# Patient Record
Sex: FEMALE | Race: WHITE | Employment: OTHER | URBAN - METROPOLITAN AREA
[De-identification: names, ages, dates, MRNs, and addresses within clinical notes are randomized per-mention and may not be internally consistent; named-entity substitution may affect disease eponyms.]

---

## 2017-09-23 ENCOUNTER — HOSPITAL ENCOUNTER (OUTPATIENT)
Dept: MAMMOGRAPHY | Age: 67
Discharge: HOME | End: 2017-09-23
Attending: FAMILY MEDICINE
Payer: MEDICARE

## 2017-09-23 DIAGNOSIS — Z12.31 VISIT FOR SCREENING MAMMOGRAM: ICD-10-CM

## 2017-09-23 PROCEDURE — 77067 SCR MAMMO BI INCL CAD: CPT

## 2018-10-21 ENCOUNTER — APPOINTMENT (OUTPATIENT)
Dept: CT IMAGING | Age: 68
DRG: 392 | End: 2018-10-21
Attending: EMERGENCY MEDICINE
Payer: MEDICARE

## 2018-10-21 ENCOUNTER — HOSPITAL ENCOUNTER (INPATIENT)
Age: 68
LOS: 2 days | Discharge: HOME | DRG: 392 | End: 2018-10-23
Attending: EMERGENCY MEDICINE | Admitting: HOSPITALIST
Payer: MEDICARE

## 2018-10-21 DIAGNOSIS — K57.92 ACUTE DIVERTICULITIS: Primary | ICD-10-CM

## 2018-10-21 PROBLEM — K57.20 DIVERTICULITIS OF COLON WITH PERFORATION: Status: ACTIVE | Noted: 2018-10-21

## 2018-10-21 LAB
ALBUMIN SERPL-MCNC: 3.3 G/DL (ref 3.2–4.6)
ALBUMIN/GLOB SERPL: 0.9 {RATIO}
ALP SERPL-CCNC: 79 U/L (ref 50–130)
ALT SERPL-CCNC: 27 U/L (ref 12–65)
ANION GAP SERPL CALC-SCNC: 7 MMOL/L
AST SERPL-CCNC: 18 U/L (ref 15–37)
BASOPHILS # BLD: 0.1 K/UL (ref 0–0.2)
BASOPHILS NFR BLD: 0 % (ref 0–2)
BILIRUB SERPL-MCNC: 0.6 MG/DL (ref 0.2–1.1)
BUN SERPL-MCNC: 8 MG/DL (ref 8–23)
CALCIUM SERPL-MCNC: 8.5 MG/DL (ref 8.3–10.4)
CHLORIDE SERPL-SCNC: 106 MMOL/L (ref 98–107)
CO2 SERPL-SCNC: 25 MMOL/L (ref 21–32)
CREAT SERPL-MCNC: 0.75 MG/DL (ref 0.6–1)
DIFFERENTIAL METHOD BLD: ABNORMAL
EOSINOPHIL # BLD: 0 K/UL (ref 0–0.8)
EOSINOPHIL NFR BLD: 0 % (ref 0.5–7.8)
ERYTHROCYTE [DISTWIDTH] IN BLOOD BY AUTOMATED COUNT: 12.5 %
GLOBULIN SER CALC-MCNC: 3.6 G/DL (ref 2.3–3.5)
GLUCOSE SERPL-MCNC: 99 MG/DL (ref 65–100)
HCT VFR BLD AUTO: 40.8 % (ref 35.8–46.3)
HGB BLD-MCNC: 12.8 G/DL (ref 11.7–15.4)
IMM GRANULOCYTES # BLD: 0 K/UL (ref 0–0.5)
IMM GRANULOCYTES NFR BLD AUTO: 0 % (ref 0–5)
LIPASE SERPL-CCNC: 132 U/L (ref 73–393)
LYMPHOCYTES # BLD: 1.4 K/UL (ref 0.5–4.6)
LYMPHOCYTES NFR BLD: 13 % (ref 13–44)
MCH RBC QN AUTO: 29.2 PG (ref 26.1–32.9)
MCHC RBC AUTO-ENTMCNC: 31.4 G/DL (ref 31.4–35)
MCV RBC AUTO: 93.2 FL (ref 79.6–97.8)
MONOCYTES # BLD: 1.1 K/UL (ref 0.1–1.3)
MONOCYTES NFR BLD: 10 % (ref 4–12)
NEUTS SEG # BLD: 8.6 K/UL (ref 1.7–8.2)
NEUTS SEG NFR BLD: 77 % (ref 43–78)
NRBC # BLD: 0 K/UL (ref 0–0.2)
PLATELET # BLD AUTO: 202 K/UL (ref 150–450)
PMV BLD AUTO: 9.4 FL (ref 9.4–12.3)
POTASSIUM SERPL-SCNC: 4 MMOL/L (ref 3.5–5.1)
PROT SERPL-MCNC: 6.9 G/DL
RBC # BLD AUTO: 4.38 M/UL (ref 4.05–5.2)
SODIUM SERPL-SCNC: 138 MMOL/L (ref 136–145)
WBC # BLD AUTO: 11.3 K/UL (ref 4.3–11.1)

## 2018-10-21 PROCEDURE — 83690 ASSAY OF LIPASE: CPT

## 2018-10-21 PROCEDURE — 85025 COMPLETE CBC W/AUTO DIFF WBC: CPT

## 2018-10-21 PROCEDURE — 74177 CT ABD & PELVIS W/CONTRAST: CPT

## 2018-10-21 PROCEDURE — 81003 URINALYSIS AUTO W/O SCOPE: CPT | Performed by: EMERGENCY MEDICINE

## 2018-10-21 PROCEDURE — 77030027138 HC INCENT SPIROMETER -A

## 2018-10-21 PROCEDURE — 96365 THER/PROPH/DIAG IV INF INIT: CPT | Performed by: EMERGENCY MEDICINE

## 2018-10-21 PROCEDURE — 74011000258 HC RX REV CODE- 258: Performed by: EMERGENCY MEDICINE

## 2018-10-21 PROCEDURE — 74011250636 HC RX REV CODE- 250/636: Performed by: EMERGENCY MEDICINE

## 2018-10-21 PROCEDURE — 99284 EMERGENCY DEPT VISIT MOD MDM: CPT | Performed by: EMERGENCY MEDICINE

## 2018-10-21 PROCEDURE — 96361 HYDRATE IV INFUSION ADD-ON: CPT | Performed by: EMERGENCY MEDICINE

## 2018-10-21 PROCEDURE — 74011250636 HC RX REV CODE- 250/636: Performed by: HOSPITALIST

## 2018-10-21 PROCEDURE — 74011636320 HC RX REV CODE- 636/320: Performed by: EMERGENCY MEDICINE

## 2018-10-21 PROCEDURE — 74011000258 HC RX REV CODE- 258: Performed by: HOSPITALIST

## 2018-10-21 PROCEDURE — 80053 COMPREHEN METABOLIC PANEL: CPT

## 2018-10-21 PROCEDURE — 65270000029 HC RM PRIVATE

## 2018-10-21 RX ORDER — FACIAL-BODY WIPES
10 EACH TOPICAL DAILY PRN
Status: DISCONTINUED | OUTPATIENT
Start: 2018-10-21 | End: 2018-10-23 | Stop reason: HOSPADM

## 2018-10-21 RX ORDER — DEXTROSE MONOHYDRATE AND SODIUM CHLORIDE 5; .9 G/100ML; G/100ML
100 INJECTION, SOLUTION INTRAVENOUS CONTINUOUS
Status: DISCONTINUED | OUTPATIENT
Start: 2018-10-21 | End: 2018-10-23 | Stop reason: HOSPADM

## 2018-10-21 RX ORDER — CIPROFLOXACIN 2 MG/ML
400 INJECTION, SOLUTION INTRAVENOUS EVERY 12 HOURS
Status: DISCONTINUED | OUTPATIENT
Start: 2018-10-21 | End: 2018-10-21 | Stop reason: SDUPTHER

## 2018-10-21 RX ORDER — SODIUM CHLORIDE 9 MG/ML
125 INJECTION, SOLUTION INTRAVENOUS CONTINUOUS
Status: DISCONTINUED | OUTPATIENT
Start: 2018-10-21 | End: 2018-10-21

## 2018-10-21 RX ORDER — SODIUM CHLORIDE 0.9 % (FLUSH) 0.9 %
5-10 SYRINGE (ML) INJECTION AS NEEDED
Status: DISCONTINUED | OUTPATIENT
Start: 2018-10-21 | End: 2018-10-23 | Stop reason: HOSPADM

## 2018-10-21 RX ORDER — SODIUM CHLORIDE 0.9 % (FLUSH) 0.9 %
5-10 SYRINGE (ML) INJECTION EVERY 8 HOURS
Status: DISCONTINUED | OUTPATIENT
Start: 2018-10-21 | End: 2018-10-23 | Stop reason: HOSPADM

## 2018-10-21 RX ORDER — DIPHENHYDRAMINE HYDROCHLORIDE 50 MG/ML
12.5 INJECTION, SOLUTION INTRAMUSCULAR; INTRAVENOUS
Status: DISCONTINUED | OUTPATIENT
Start: 2018-10-21 | End: 2018-10-23 | Stop reason: HOSPADM

## 2018-10-21 RX ORDER — NALOXONE HYDROCHLORIDE 0.4 MG/ML
0.4 INJECTION, SOLUTION INTRAMUSCULAR; INTRAVENOUS; SUBCUTANEOUS AS NEEDED
Status: DISCONTINUED | OUTPATIENT
Start: 2018-10-21 | End: 2018-10-23 | Stop reason: HOSPADM

## 2018-10-21 RX ORDER — SODIUM CHLORIDE 0.9 % (FLUSH) 0.9 %
10 SYRINGE (ML) INJECTION
Status: COMPLETED | OUTPATIENT
Start: 2018-10-21 | End: 2018-10-21

## 2018-10-21 RX ORDER — ONDANSETRON 2 MG/ML
4 INJECTION INTRAMUSCULAR; INTRAVENOUS
Status: DISCONTINUED | OUTPATIENT
Start: 2018-10-21 | End: 2018-10-23 | Stop reason: HOSPADM

## 2018-10-21 RX ORDER — CIPROFLOXACIN 2 MG/ML
400 INJECTION, SOLUTION INTRAVENOUS ONCE
Status: COMPLETED | OUTPATIENT
Start: 2018-10-21 | End: 2018-10-21

## 2018-10-21 RX ORDER — ENOXAPARIN SODIUM 100 MG/ML
40 INJECTION SUBCUTANEOUS EVERY 24 HOURS
Status: DISCONTINUED | OUTPATIENT
Start: 2018-10-21 | End: 2018-10-23 | Stop reason: HOSPADM

## 2018-10-21 RX ORDER — IBUPROFEN 200 MG
1 TABLET ORAL EVERY 24 HOURS
Status: DISCONTINUED | OUTPATIENT
Start: 2018-10-21 | End: 2018-10-23 | Stop reason: HOSPADM

## 2018-10-21 RX ORDER — METRONIDAZOLE 500 MG/100ML
500 INJECTION, SOLUTION INTRAVENOUS
Status: COMPLETED | OUTPATIENT
Start: 2018-10-21 | End: 2018-10-21

## 2018-10-21 RX ORDER — MORPHINE SULFATE 4 MG/ML
4 INJECTION, SOLUTION INTRAMUSCULAR; INTRAVENOUS
Status: DISCONTINUED | OUTPATIENT
Start: 2018-10-21 | End: 2018-10-23 | Stop reason: HOSPADM

## 2018-10-21 RX ORDER — SODIUM CHLORIDE 9 MG/ML
200 INJECTION, SOLUTION INTRAVENOUS CONTINUOUS
Status: DISCONTINUED | OUTPATIENT
Start: 2018-10-21 | End: 2018-10-21

## 2018-10-21 RX ORDER — ACETAMINOPHEN 325 MG/1
650 TABLET ORAL
Status: DISCONTINUED | OUTPATIENT
Start: 2018-10-21 | End: 2018-10-23 | Stop reason: HOSPADM

## 2018-10-21 RX ADMIN — DEXTROSE MONOHYDRATE AND SODIUM CHLORIDE 150 ML/HR: 5; .9 INJECTION, SOLUTION INTRAVENOUS at 21:13

## 2018-10-21 RX ADMIN — SODIUM CHLORIDE 100 ML: 900 INJECTION, SOLUTION INTRAVENOUS at 12:04

## 2018-10-21 RX ADMIN — SODIUM CHLORIDE 2000 ML: 900 INJECTION, SOLUTION INTRAVENOUS at 16:53

## 2018-10-21 RX ADMIN — SODIUM CHLORIDE 200 ML/HR: 900 INJECTION, SOLUTION INTRAVENOUS at 10:41

## 2018-10-21 RX ADMIN — METRONIDAZOLE 500 MG: 500 INJECTION, SOLUTION INTRAVENOUS at 12:53

## 2018-10-21 RX ADMIN — SODIUM CHLORIDE 1000 ML: 900 INJECTION, SOLUTION INTRAVENOUS at 19:00

## 2018-10-21 RX ADMIN — PIPERACILLIN SODIUM,TAZOBACTAM SODIUM 3.38 G: 3; .375 INJECTION, POWDER, FOR SOLUTION INTRAVENOUS at 16:57

## 2018-10-21 RX ADMIN — SODIUM CHLORIDE 1000 ML: 900 INJECTION, SOLUTION INTRAVENOUS at 20:11

## 2018-10-21 RX ADMIN — ENOXAPARIN SODIUM 40 MG: 40 INJECTION, SOLUTION INTRAVENOUS; SUBCUTANEOUS at 17:02

## 2018-10-21 RX ADMIN — DEXTROSE MONOHYDRATE AND SODIUM CHLORIDE 150 ML/HR: 5; .9 INJECTION, SOLUTION INTRAVENOUS at 16:56

## 2018-10-21 RX ADMIN — CIPROFLOXACIN 400 MG: 2 INJECTION, SOLUTION INTRAVENOUS at 13:33

## 2018-10-21 RX ADMIN — IOPAMIDOL 100 ML: 755 INJECTION, SOLUTION INTRAVENOUS at 12:05

## 2018-10-21 RX ADMIN — Medication 10 ML: at 12:04

## 2018-10-21 NOTE — H&P
HOSPITALIST HISTORY AND PHYSICALNAME:  Saulo Wu Age:  70 y.o. 
:   1950 MRN:   [id number] PCP: Myra Rico MD 
Consulting MD: Treatment Team: Attending Provider: Arrie Merlin, MD; Primary Nurse: Meghana Bone RN 
 
REASON FOR ADMISSION:acute diverticulitis with perforation HPI:  
Ms Zelalem Andrew is a 70year old female with no chronic medical illness, only on OTC vitamins. Who was relatively well till past 3 days when she began to have lower abdominal pain, cramping in nature initially 3/10 this has progressively got worse last evening, with sharp pains, that was not relieved with movement or aspirin taken. She has never had symptoms like this prior. She initially thought possible uti, but has no dysuria, or frequency, she has had formed bowel movements last one this morning, with no blood. No nausea no vomiting associated with it. Last colonoscopy within the past 2-3 years, no masses may have had polyps removed. Can not recall previous diagnosis of diverticulosis Complete ROS done and is as stated in HPI or otherwise negative History reviewed. No pertinent past medical history. Past Surgical History:  
Procedure Laterality Date  HX APPENDECTOMY  HX HIP FRACTURE TX Left None No Known Allergies Social History Tobacco Use  Smoking status: Never Smoker  Smokeless tobacco: Never Used Substance Use Topics  Alcohol use: No  
  Frequency: Never Family History Problem Relation Age of Onset  Breast Cancer Neg Hx Objective:  
 
Visit Vitals /70 (BP 1 Location: Left arm, BP Patient Position: At rest) Pulse 85 Temp 98 °F (36.7 °C) Resp 18 Ht 5' 6\" (1.676 m) Wt 77.1 kg (170 lb) SpO2 98% BMI 27.44 kg/m² Temp (24hrs), Av °F (36.7 °C), Min:98 °F (36.7 °C), Max:98 °F (36.7 °C) Oxygen Therapy O2 Sat (%): 98 % (10/21/18 1001) O2 Device: Room air (10/21/18 1001) Physical Exam: General:    Alert, cooperative, no distress, appears stated age. Head:   Normocephalic, without obvious abnormality, atraumatic. Nose:  Nares normal. No drainage or sinus tenderness. Lungs:   Clear to auscultation bilaterally. No Wheezing or Rhonchi. No rales. Heart:   Regular rate and rhythm,  no murmur, rub or gallop. Abdomen:   Soft, tender. Mildly distended lower abdomen  Bowel sounds normal.  
Extremities: No cyanosis. No edema. No clubbing Skin:     Texture, turgor normal. No rashes or lesions. Not Jaundiced Neurologic: Alert and oriented x 3, no focal deficits Data Review: personally by me Recent Results (from the past 24 hour(s)) CBC WITH AUTOMATED DIFF Collection Time: 10/21/18 10:40 AM  
Result Value Ref Range WBC 11.3 (H) 4.3 - 11.1 K/uL  
 RBC 4.38 4.05 - 5.2 M/uL  
 HGB 12.8 11.7 - 15.4 g/dL HCT 40.8 35.8 - 46.3 % MCV 93.2 79.6 - 97.8 FL  
 MCH 29.2 26.1 - 32.9 PG  
 MCHC 31.4 31.4 - 35.0 g/dL  
 RDW 12.5 % PLATELET 374 329 - 796 K/uL MPV 9.4 9.4 - 12.3 FL ABSOLUTE NRBC 0.00 0.0 - 0.2 K/uL  
 DF AUTOMATED NEUTROPHILS 77 43 - 78 % LYMPHOCYTES 13 13 - 44 % MONOCYTES 10 4.0 - 12.0 % EOSINOPHILS 0 (L) 0.5 - 7.8 % BASOPHILS 0 0.0 - 2.0 % IMMATURE GRANULOCYTES 0 0.0 - 5.0 %  
 ABS. NEUTROPHILS 8.6 (H) 1.7 - 8.2 K/UL  
 ABS. LYMPHOCYTES 1.4 0.5 - 4.6 K/UL  
 ABS. MONOCYTES 1.1 0.1 - 1.3 K/UL  
 ABS. EOSINOPHILS 0.0 0.0 - 0.8 K/UL  
 ABS. BASOPHILS 0.1 0.0 - 0.2 K/UL  
 ABS. IMM. GRANS. 0.0 0.0 - 0.5 K/UL METABOLIC PANEL, COMPREHENSIVE Collection Time: 10/21/18 10:40 AM  
Result Value Ref Range Sodium 138 136 - 145 mmol/L Potassium 4.0 3.5 - 5.1 mmol/L Chloride 106 98 - 107 mmol/L  
 CO2 25 21 - 32 mmol/L Anion gap 7 mmol/L Glucose 99 65 - 100 mg/dL BUN 8 8 - 23 MG/DL Creatinine 0.75 0.6 - 1.0 MG/DL  
 GFR est AA >60 >60 ml/min/1.73m2 GFR est non-AA >60 ml/min/1.73m2  Calcium 8.5 8.3 - 10.4 MG/DL  
 Bilirubin, total 0.6 0.2 - 1.1 MG/DL  
 ALT (SGPT) 27 12 - 65 U/L  
 AST (SGOT) 18 15 - 37 U/L Alk. phosphatase 79 50 - 130 U/L Protein, total 6.9 g/dL Albumin 3.3 3.2 - 4.6 g/dL Globulin 3.6 (H) 2.3 - 3.5 g/dL A-G Ratio 0.9 LIPASE Collection Time: 10/21/18 10:40 AM  
Result Value Ref Range Lipase 132 73 - 393 U/L Imaging /Procedures /Studies reviewed personally by me XR Results (most recent): No results found for this or any previous visit. CT Scan Results from Hospital Encounter encounter on 10/21/18 CT ABD PELV W CONT Narrative CT ABDOMEN AND PELVIS WITH CONTRAST HISTORY: lower abdominal pain. Concern for diverticulitis. TECHNIQUE: Helically acquired images were obtained from the domes of the 
diaphragms to the ischial tuberosities reconstructed at 5mm intervals after the 
uneventful administration of 125cc's of intravenous Isovue-370 in order to 
better evaluate the solid abdominal viscera and vascular structures. Oral 
contrast was not administered per the emergency imaging BMI protocol. Coronal 
reformatted images were submitted. Radiation dose reduction techniques were used for this study:  Our CT scanners 
use one or all of the following: Automated exposure control, adjustment of the 
mA and/or kVp according to patient's size, iterative reconstruction. COMPARISON: None. CT ABDOMEN: There are several low-density lesions within the liver, too small to 
characterize but statistically most likely representing cysts. . Scattered 
hepatic and stomach calcifications are also present suggesting prior 
granulomatous disease. A left lower lobe granuloma is also present. The 
pancreas, adrenal glands and kidneys are unremarkable. No adenopathy or ascites 
is present. There are no inflammatory changes. There are mild atherosclerotic 
changes involving the abdominal aorta. There is a mild remote compression 
deformity at T12. CT PELVIS: There are few sigmoid diverticula. There are mild inflammatory 
changes associated with a diverticulum with few tiny foci of extraluminal gas 
most compatible with diverticulitis and contained perforation. There is no 
abscess or bowel obstruction. There is streak artifact from left hip pinning. No 
adenopathy or ascites is present. Impression IMPRESSION: 
1. Sigmoid diverticulitis with contained/focal perforation. 2. Hepatic low density lesions most likely representing cysts. VAS/US Results (most recent): No results found for this or any previous visit. Assessment and Plan: Active Hospital Problems Diagnosis Date Noted  Diverticulitis of colon with perforation 10/21/2018 PLAN 
· Acute diverticulitis of colon with perforation - will start patient on zosyn given the perforation, will keep NPO for now with sips only. Will monitor overnight, surgery was consulted from the ED. Will continue IV fluids IV pain medications, Code Status: full code Anticipated discharge: 2-3 midnight for IV antibiotics Patient remains high risk given perforation, development of overwhelming sepsis, extension of perforation if not admitted to hospital for IV antibiotics and bowel rest 
 
Signed By: Lidia Cueva MD   
 October 21, 2018

## 2018-10-21 NOTE — PROGRESS NOTES
Admission assessment complete, see flow sheet for full assessment. Pt alert and oriented x4 denies pain at this time. Respirations even and unlabored breath sounds clear bilaterally. S1 S2 auscultated, VSS. Skin warm, dry Abdomen soft and tender to Lower abdomen, bowel sounds active in all quadrants. NPO Bed low and locked, call light within reach, pt advised to call if assistance is needed.

## 2018-10-21 NOTE — CONSULTS
H&P/Consult Note/Progress Note/Office Note:   Pedro Pablo Oneal  MRN: [id number]  HPR:8/41/6370  Age:68 y.o. General Surgery Consult ordered by: Dr. Wendy Lennon  Reason for General Surgery Consult: Abd pain/ Diverticulitis w/ microperf. HPI: Pedro Pablo Oneal is a 70 y.o. female who presented to the ED 10/21/18 for evaluation of constant lower abd pain described initially as cramping, however, now described as sharp. Pain has progressively worsened over the last 48 hours. No nausea or vomiting. Pt denies previous hx of similar symptoms. LBM this am; described as normal. No significant past medical history. Past surgical Hx includes tubal ligation and appendectomyTakes no Rx medications. WBC 11.3    Last colonoscopy approximately 2 years ago. History reviewed. No pertinent past medical history.   Past Surgical History:   Procedure Laterality Date    HX APPENDECTOMY      HX HIP FRACTURE TX Left      Current Facility-Administered Medications   Medication Dose Route Frequency    0.9% sodium chloride infusion  125 mL/hr IntraVENous CONTINUOUS    sodium chloride (NS) flush 5-10 mL  5-10 mL IntraVENous Q8H    sodium chloride (NS) flush 5-10 mL  5-10 mL IntraVENous PRN    acetaminophen (TYLENOL) tablet 650 mg  650 mg Oral Q4H PRN    morphine injection 4 mg  4 mg IntraVENous Q4H PRN    ondansetron (ZOFRAN) injection 4 mg  4 mg IntraVENous Q4H PRN    diphenhydrAMINE (BENADRYL) injection 12.5 mg  12.5 mg IntraVENous Q4H PRN    bisacodyl (DULCOLAX) suppository 10 mg  10 mg Rectal DAILY PRN    nicotine (NICODERM CQ) 21 mg/24 hr patch 1 Patch  1 Patch TransDERmal Q24H    enoxaparin (LOVENOX) injection 40 mg  40 mg SubCUTAneous Q24H    dextrose 5% and 0.9% NaCl infusion  150 mL/hr IntraVENous CONTINUOUS    naloxone (NARCAN) injection 0.4 mg  0.4 mg IntraVENous PRN    sodium chloride 0.9 % bolus infusion 2,000 mL  2,000 mL IntraVENous ONCE    piperacillin-tazobactam (ZOSYN) 3.375 g in 0.9% sodium chloride (MBP/ADV) 100 mL  3.375 g IntraVENous Q8H    sodium chloride 0.9 % bolus infusion 1,000 mL  1,000 mL IntraVENous ONCE    Followed by    sodium chloride 0.9 % bolus infusion 1,000 mL  1,000 mL IntraVENous ONCE     Patient has no known allergies. Social History     Socioeconomic History    Marital status:      Spouse name: Not on file    Number of children: Not on file    Years of education: Not on file    Highest education level: Not on file   Social Needs    Financial resource strain: Not on file    Food insecurity - worry: Not on file    Food insecurity - inability: Not on file   [employer] needs - medical: Not on file   [employer] needs - non-medical: Not on file   Occupational History    Not on file   Tobacco Use    Smoking status: Never Smoker    Smokeless tobacco: Never Used   Substance and Sexual Activity    Alcohol use: No     Frequency: Never    Drug use: No    Sexual activity: Not on file   Other Topics Concern    Not on file   Social History Narrative    Not on file     Social History     Tobacco Use   Smoking Status Never Smoker   Smokeless Tobacco Never Used     Family History   Problem Relation Age of Onset    Breast Cancer Neg Hx      ROS: The patient has no difficulty with chest pain or shortness of breath. No fever or chills. Comprehensive review of systems was otherwise unremarkable except as noted above. Physical Exam:   Visit Vitals  /86 (BP 1 Location: Right arm, BP Patient Position: At rest;Supine)   Pulse 75   Temp 98.1 °F (36.7 °C)   Resp 19   Ht 5' 6\" (1.676 m)   Wt 170 lb (77.1 kg)   SpO2 98%   Breastfeeding? No   BMI 27.44 kg/m²     Vitals:    10/21/18 1001 10/21/18 1336 10/21/18 1430 10/21/18 1522   BP: 139/70 138/70 127/76 136/86   Pulse: 85   75   Resp: 18   19   Temp: 98 °F (36.7 °C)   98.1 °F (36.7 °C)   SpO2: 98% 97% 97% 98%   Weight: 170 lb (77.1 kg)      Height: 5' 6\" (1.676 m)        No intake/output data recorded.   No intake/output data recorded. Constitutional: Alert, cooperative, no acute distress; appears stated age    Eyes: Sclera are clear. ENMT: no external lesions gross hearing normal; no obvious neck masses, no ear or lip lesions, nares normal  CV: RRR. Normal perfusion  Resp: No JVD. Breathing is  non-labored; no audible wheezing. GI: soft, sl-distended, generalized ttp entire abd. No rebound or guarding. Musculoskeletal: unremarkable with normal function. No embolic signs or cyanosis. Neuro:  Oriented; moves all 4; no focal deficits  Psychiatric: normal affect and mood, no memory impairment    Recent vitals (if inpt):  Patient Vitals for the past 24 hrs:   BP Temp Pulse Resp SpO2 Height Weight   10/21/18 1522 136/86 98.1 °F (36.7 °C) 75 19 98 %     10/21/18 1430 127/76    97 %     10/21/18 1336 138/70    97 %     10/21/18 1001 139/70 98 °F (36.7 °C) 85 18 98 % 5' 6\" (1.676 m) 170 lb (77.1 kg)       Labs:  Recent Labs     10/21/18  1040   WBC 11.3*   HGB 12.8         K 4.0      CO2 25   BUN 8   CREA 0.75   GLU 99   TBILI 0.6   SGOT 18   ALT 27   AP 79   LPSE 132       Lab Results   Component Value Date/Time    WBC 11.3 (H) 10/21/2018 10:40 AM    HGB 12.8 10/21/2018 10:40 AM    PLATELET 282 58/25/6733 10:40 AM    Sodium 138 10/21/2018 10:40 AM    Potassium 4.0 10/21/2018 10:40 AM    Chloride 106 10/21/2018 10:40 AM    CO2 25 10/21/2018 10:40 AM    BUN 8 10/21/2018 10:40 AM    Creatinine 0.75 10/21/2018 10:40 AM    Glucose 99 10/21/2018 10:40 AM    Bilirubin, total 0.6 10/21/2018 10:40 AM    AST (SGOT) 18 10/21/2018 10:40 AM    ALT (SGPT) 27 10/21/2018 10:40 AM    Alk. phosphatase 79 10/21/2018 10:40 AM    Lipase 132 10/21/2018 10:40 AM        10/21/18 CT ABDOMEN AND PELVIS WITH CONTRAST     HISTORY: lower abdominal pain.  Concern for diverticulitis.      TECHNIQUE: Helically acquired images were obtained from the domes of the   diaphragms to the ischial tuberosities reconstructed at 5mm intervals after the   uneventful administration of 125cc's of intravenous Isovue-370 in order to   better evaluate the solid abdominal viscera and vascular structures. Oral   contrast was not administered per the emergency imaging BMI protocol. Coronal   reformatted images were submitted. Radiation dose reduction techniques were used for this study:  Our CT scanners   use one or all of the following: Automated exposure control, adjustment of the   mA and/or kVp according to patient's size, iterative reconstruction. COMPARISON: None. CT ABDOMEN: There are several low-density lesions within the liver, too small to   characterize but statistically most likely representing cysts. . Scattered   hepatic and stomach calcifications are also present suggesting prior   granulomatous disease. A left lower lobe granuloma is also present. The   pancreas, adrenal glands and kidneys are unremarkable. No adenopathy or ascites   is present. There are no inflammatory changes. There are mild atherosclerotic   changes involving the abdominal aorta. There is a mild remote compression   deformity at T12. CT PELVIS: There are few sigmoid diverticula. There are mild inflammatory   changes associated with a diverticulum with few tiny foci of extraluminal gas   most compatible with diverticulitis and contained perforation. There is no   abscess or bowel obstruction. There is streak artifact from left hip pinning. No   adenopathy or ascites is present. Impression     IMPRESSION:   1. Sigmoid diverticulitis with contained/focal perforation. 2. Hepatic low density lesions most likely representing cysts. I reviewed recent labs and recent radiologic studies. I independently reviewed radiology images for studies I described above or studies I have ordered.    Admission date (for inpatients): 10/21/2018   * No surgery found *  * No surgery found *    ASSESSMENT/PLAN:  Problem List  Never Reviewed Codes Class Noted    * (Principal) Diverticulitis of colon with perforation ICD-10-CM: K57.20  ICD-9-CM: 562.11  10/21/2018            Principal Problem:    Diverticulitis of colon with perforation (10/21/2018)       Plan:  Care management per hospitalist  IVF  NPO  Pain control  OOB/ Ambulate with assist  Follow labs  IV abx - Zosyn  Will follow - Further orders per Dr. Galdino Gay    Signed:  NORA Mendieta-BC    May start clear liquids. Unlikely to require surgery. Needs to follow up with GI in 8-12 weeks for a repeat colonoscopy.   Galdino Gay MD.

## 2018-10-21 NOTE — ED PROVIDER NOTES
69-year-old female noticed a vague onset of some lower bowel pain 2 days ago. Seemed to worsen yesterday. Somewhat waxing and waning. Lower abdomen that radiates to her back. Slight upper abdominal pain. She's had no vomiting or diarrhea no change in urine. No rash. The pain like this previously. Does have history of appendectomy. Denies any history of diverticulitis or pancreatitis. Seen in urgent care and referred here. Feels like a lower ache and pressure. No aggravating or relieving factors. The history is provided by the patient. Abdominal Pain This is a new problem. The current episode started 2 days ago. The problem occurs constantly. The problem has been gradually worsening. The pain is located in the epigastric region and suprapubic region. The quality of the pain is dull and pressure-like. The pain is moderate. Associated symptoms include back pain. Pertinent negatives include no fever, no diarrhea, no hematochezia, no melena, no nausea, no vomiting, no constipation, no dysuria, no frequency, no hematuria, no headaches and no chest pain. Nothing worsens the pain. The pain is relieved by nothing. Her past medical history does not include pancreatitis, diverticulitis or kidney stones. The patient's surgical history includes appendectomy. History reviewed. No pertinent past medical history. Past Surgical History:  
Procedure Laterality Date  HX APPENDECTOMY  HX HIP FRACTURE TX Left Family History:  
Problem Relation Age of Onset  Breast Cancer Neg Hx Social History Socioeconomic History  Marital status:  Spouse name: Not on file  Number of children: Not on file  Years of education: Not on file  Highest education level: Not on file Social Needs  Financial resource strain: Not on file  Food insecurity - worry: Not on file  Food insecurity - inability: Not on file  Transportation needs - medical: Not on file  Transportation needs - non-medical: Not on file Occupational History  Not on file Tobacco Use  Smoking status: Never Smoker  Smokeless tobacco: Never Used Substance and Sexual Activity  Alcohol use: No  
  Frequency: Never  Drug use: No  
 Sexual activity: Not on file Other Topics Concern  Not on file Social History Narrative  Not on file ALLERGIES: Patient has no known allergies. Review of Systems Constitutional: Negative for chills and fever. HENT: Negative for ear pain. Respiratory: Negative for cough and shortness of breath. Cardiovascular: Negative for chest pain and palpitations. Gastrointestinal: Positive for abdominal pain. Negative for constipation, diarrhea, hematochezia, melena, nausea and vomiting. Genitourinary: Negative for dysuria, flank pain, frequency and hematuria. Musculoskeletal: Positive for back pain. Negative for neck pain. Skin: Negative for color change and rash. Neurological: Negative for syncope and headaches. All other systems reviewed and are negative. Vitals:  
 10/21/18 1001 BP: 139/70 Pulse: 85 Resp: 18 Temp: 98 °F (36.7 °C) SpO2: 98% Weight: 77.1 kg (170 lb) Height: 5' 6\" (1.676 m) Physical Exam  
Constitutional: She is oriented to person, place, and time. She appears well-developed and well-nourished. No distress. HENT:  
Head: Normocephalic and atraumatic. Right Ear: External ear normal.  
Left Ear: External ear normal.  
Mouth/Throat: Oropharynx is clear and moist. No oropharyngeal exudate. Eyes: Conjunctivae and EOM are normal. Pupils are equal, round, and reactive to light. Neck: Normal range of motion. Neck supple. Cardiovascular: Normal rate, regular rhythm and intact distal pulses. No murmur heard. Pulmonary/Chest: Breath sounds normal. No respiratory distress. Abdominal: Soft.  Bowel sounds are normal. She exhibits no pulsatile midline mass and no mass. There is tenderness in the epigastric area and suprapubic area. There is no rebound and no guarding. No hernia. Neurological: She is alert and oriented to person, place, and time. Gait normal.  
Nl speech Skin: Skin is warm and dry. Psychiatric: She has a normal mood and affect. Her speech is normal.  
Nursing note and vitals reviewed. MDM Number of Diagnoses or Management Options Diagnosis management comments: UTI, kidney stone, diverticulitis. Less likely aortic aneurysm. Doubt bowel obstruction Amount and/or Complexity of Data Reviewed Clinical lab tests: ordered and reviewed Tests in the radiology section of CPT®: ordered and reviewed Risk of Complications, Morbidity, and/or Mortality Presenting problems: moderate Diagnostic procedures: low Management options: moderate Patient Progress Patient progress: stable Procedures Results Include: 
 
Recent Results (from the past 24 hour(s)) CBC WITH AUTOMATED DIFF Collection Time: 10/21/18 10:40 AM  
Result Value Ref Range WBC 11.3 (H) 4.3 - 11.1 K/uL  
 RBC 4.38 4.05 - 5.2 M/uL  
 HGB 12.8 11.7 - 15.4 g/dL HCT 40.8 35.8 - 46.3 % MCV 93.2 79.6 - 97.8 FL  
 MCH 29.2 26.1 - 32.9 PG  
 MCHC 31.4 31.4 - 35.0 g/dL  
 RDW 12.5 % PLATELET 144 600 - 596 K/uL MPV 9.4 9.4 - 12.3 FL ABSOLUTE NRBC 0.00 0.0 - 0.2 K/uL  
 DF AUTOMATED NEUTROPHILS 77 43 - 78 % LYMPHOCYTES 13 13 - 44 % MONOCYTES 10 4.0 - 12.0 % EOSINOPHILS 0 (L) 0.5 - 7.8 % BASOPHILS 0 0.0 - 2.0 % IMMATURE GRANULOCYTES 0 0.0 - 5.0 %  
 ABS. NEUTROPHILS 8.6 (H) 1.7 - 8.2 K/UL  
 ABS. LYMPHOCYTES 1.4 0.5 - 4.6 K/UL  
 ABS. MONOCYTES 1.1 0.1 - 1.3 K/UL  
 ABS. EOSINOPHILS 0.0 0.0 - 0.8 K/UL  
 ABS. BASOPHILS 0.1 0.0 - 0.2 K/UL  
 ABS. IMM. GRANS. 0.0 0.0 - 0.5 K/UL METABOLIC PANEL, COMPREHENSIVE Collection Time: 10/21/18 10:40 AM  
Result Value Ref Range  Sodium 138 136 - 145 mmol/L  
 Potassium 4.0 3.5 - 5.1 mmol/L Chloride 106 98 - 107 mmol/L  
 CO2 25 21 - 32 mmol/L Anion gap 7 mmol/L Glucose 99 65 - 100 mg/dL BUN 8 8 - 23 MG/DL Creatinine 0.75 0.6 - 1.0 MG/DL  
 GFR est AA >60 >60 ml/min/1.73m2 GFR est non-AA >60 ml/min/1.73m2 Calcium 8.5 8.3 - 10.4 MG/DL Bilirubin, total 0.6 0.2 - 1.1 MG/DL  
 ALT (SGPT) 27 12 - 65 U/L  
 AST (SGOT) 18 15 - 37 U/L Alk. phosphatase 79 50 - 130 U/L Protein, total 6.9 g/dL Albumin 3.3 3.2 - 4.6 g/dL Globulin 3.6 (H) 2.3 - 3.5 g/dL A-G Ratio 0.9 LIPASE Collection Time: 10/21/18 10:40 AM  
Result Value Ref Range Lipase 132 73 - 393 U/L Ct Abd Pelv W Cont Result Date: 10/21/2018 CT ABDOMEN AND PELVIS WITH CONTRAST HISTORY: lower abdominal pain. Concern for diverticulitis. TECHNIQUE: Helically acquired images were obtained from the domes of the diaphragms to the ischial tuberosities reconstructed at 5mm intervals after the uneventful administration of 125cc's of intravenous Isovue-370 in order to better evaluate the solid abdominal viscera and vascular structures. Oral contrast was not administered per the emergency imaging BMI protocol. Coronal reformatted images were submitted. Radiation dose reduction techniques were used for this study:  Our CT scanners use one or all of the following: Automated exposure control, adjustment of the mA and/or kVp according to patient's size, iterative reconstruction. COMPARISON: None. CT ABDOMEN: There are several low-density lesions within the liver, too small to characterize but statistically most likely representing cysts. . Scattered hepatic and stomach calcifications are also present suggesting prior granulomatous disease. A left lower lobe granuloma is also present. The pancreas, adrenal glands and kidneys are unremarkable. No adenopathy or ascites is present. There are no inflammatory changes.  There are mild atherosclerotic changes involving the abdominal aorta. There is a mild remote compression deformity at T12. CT PELVIS: There are few sigmoid diverticula. There are mild inflammatory changes associated with a diverticulum with few tiny foci of extraluminal gas most compatible with diverticulitis and contained perforation. There is no abscess or bowel obstruction. There is streak artifact from left hip pinning. No adenopathy or ascites is present. IMPRESSION: 1. Sigmoid diverticulitis with contained/focal perforation. 2. Hepatic low density lesions most likely representing cysts. Discussed with surgery. They believe patient can be managed nonsurgical and have hospitalist admit. They can consult.

## 2018-10-21 NOTE — ED NOTES
TRANSFER - OUT REPORT: 
 
Verbal report given to [facility] on [place]  being transferred to [id number] [phone] for routine progression of care Report consisted of patients Situation, Background, Assessment and  
Recommendations(SBAR). Information from the following report(s) ED Summary was reviewed with the receiving nurse. Lines:  
Peripheral IV 10/21/18 Left Antecubital (Active) Site Assessment Clean, dry, & intact 10/21/2018 10:40 AM  
Phlebitis Assessment 0 10/21/2018 10:40 AM  
Infiltration Assessment 0 10/21/2018 10:40 AM  
Dressing Status Clean, dry, & intact 10/21/2018 10:40 AM  
  
 
Opportunity for questions and clarification was provided. Patient transported with: 
 Clone

## 2018-10-22 LAB
ANION GAP SERPL CALC-SCNC: 6 MMOL/L
BUN SERPL-MCNC: 4 MG/DL (ref 8–23)
CALCIUM SERPL-MCNC: 7.3 MG/DL (ref 8.3–10.4)
CHLORIDE SERPL-SCNC: 113 MMOL/L (ref 98–107)
CO2 SERPL-SCNC: 23 MMOL/L (ref 21–32)
CREAT SERPL-MCNC: 0.62 MG/DL (ref 0.6–1)
ERYTHROCYTE [DISTWIDTH] IN BLOOD BY AUTOMATED COUNT: 12.7 %
GLUCOSE SERPL-MCNC: 136 MG/DL (ref 65–100)
HCT VFR BLD AUTO: 33.2 % (ref 35.8–46.3)
HGB BLD-MCNC: 10.6 G/DL (ref 11.7–15.4)
MCH RBC QN AUTO: 30.2 PG (ref 26.1–32.9)
MCHC RBC AUTO-ENTMCNC: 31.9 G/DL (ref 31.4–35)
MCV RBC AUTO: 94.6 FL (ref 79.6–97.8)
NRBC # BLD: 0 K/UL (ref 0–0.2)
PLATELET # BLD AUTO: 173 K/UL (ref 150–450)
PMV BLD AUTO: 10.2 FL (ref 9.4–12.3)
POTASSIUM SERPL-SCNC: 3.4 MMOL/L (ref 3.5–5.1)
RBC # BLD AUTO: 3.51 M/UL (ref 4.05–5.2)
SODIUM SERPL-SCNC: 142 MMOL/L (ref 136–145)
WBC # BLD AUTO: 6.3 K/UL (ref 4.3–11.1)

## 2018-10-22 PROCEDURE — 74011000258 HC RX REV CODE- 258: Performed by: HOSPITALIST

## 2018-10-22 PROCEDURE — 74011250636 HC RX REV CODE- 250/636: Performed by: HOSPITALIST

## 2018-10-22 PROCEDURE — 36415 COLL VENOUS BLD VENIPUNCTURE: CPT

## 2018-10-22 PROCEDURE — 65270000029 HC RM PRIVATE

## 2018-10-22 PROCEDURE — 77030020245 HC SOL INJ 5% D/0.9%NACL

## 2018-10-22 PROCEDURE — 80048 BASIC METABOLIC PNL TOTAL CA: CPT

## 2018-10-22 PROCEDURE — 85027 COMPLETE CBC AUTOMATED: CPT

## 2018-10-22 RX ORDER — METRONIDAZOLE 500 MG/100ML
500 INJECTION, SOLUTION INTRAVENOUS EVERY 12 HOURS
Status: DISCONTINUED | OUTPATIENT
Start: 2018-10-22 | End: 2018-10-23

## 2018-10-22 RX ORDER — CIPROFLOXACIN 2 MG/ML
400 INJECTION, SOLUTION INTRAVENOUS EVERY 12 HOURS
Status: DISCONTINUED | OUTPATIENT
Start: 2018-10-22 | End: 2018-10-23

## 2018-10-22 RX ADMIN — ENOXAPARIN SODIUM 40 MG: 40 INJECTION, SOLUTION INTRAVENOUS; SUBCUTANEOUS at 15:38

## 2018-10-22 RX ADMIN — DEXTROSE MONOHYDRATE AND SODIUM CHLORIDE 100 ML/HR: 5; .9 INJECTION, SOLUTION INTRAVENOUS at 23:00

## 2018-10-22 RX ADMIN — Medication 10 ML: at 23:02

## 2018-10-22 RX ADMIN — Medication 10 ML: at 15:39

## 2018-10-22 RX ADMIN — PIPERACILLIN SODIUM,TAZOBACTAM SODIUM 3.38 G: 3; .375 INJECTION, POWDER, FOR SOLUTION INTRAVENOUS at 09:16

## 2018-10-22 RX ADMIN — DEXTROSE MONOHYDRATE AND SODIUM CHLORIDE 150 ML/HR: 5; .9 INJECTION, SOLUTION INTRAVENOUS at 04:12

## 2018-10-22 RX ADMIN — METRONIDAZOLE 500 MG: 500 INJECTION, SOLUTION INTRAVENOUS at 23:03

## 2018-10-22 RX ADMIN — CIPROFLOXACIN 400 MG: 2 INJECTION, SOLUTION INTRAVENOUS at 23:02

## 2018-10-22 RX ADMIN — PIPERACILLIN SODIUM,TAZOBACTAM SODIUM 3.38 G: 3; .375 INJECTION, POWDER, FOR SOLUTION INTRAVENOUS at 00:44

## 2018-10-22 RX ADMIN — DIPHENHYDRAMINE HYDROCHLORIDE 12.5 MG: 50 INJECTION, SOLUTION INTRAMUSCULAR; INTRAVENOUS at 22:28

## 2018-10-22 RX ADMIN — PIPERACILLIN SODIUM,TAZOBACTAM SODIUM 3.38 G: 3; .375 INJECTION, POWDER, FOR SOLUTION INTRAVENOUS at 15:39

## 2018-10-22 NOTE — PROGRESS NOTES
AM assessment completed. PT AAO x 4 sitting up in bed. Respirations are present, normal and unlabored. IV site is CDI with fluids infusing. Bed is low and locked with call light in reach. Will continue to monitor.

## 2018-10-22 NOTE — PROGRESS NOTES
Continues to arouse easily at intervals, voiding without difficulty with no c/o. No distress noted. Reports having passed gas, no BM.

## 2018-10-22 NOTE — PROGRESS NOTES
General Surgery Progress Note 
 
10/22/2018 Admit Date: 10/21/2018 Subjective:  
 
Surgery Patient says she \"had a good night. \" Less abdominal pain than on admission. Passing flatus. She says she is hungry. Objective:  
 
Visit Vitals /72 Pulse 67 Temp 97.2 °F (36.2 °C) Resp 16 Ht 5' 6\" (1.676 m) Wt 170 lb (77.1 kg) SpO2 94% Breastfeeding? No  
BMI 27.44 kg/m² Intake/Output Summary (Last 24 hours) at 10/22/2018 0800 Last data filed at 10/22/2018 9299 Gross per 24 hour Intake  Output 1350 ml Net -1350 ml EXAM:  ABD soft, very mild LLQ tenderness. No rebound or guarding. Data Review Recent Results (from the past 24 hour(s)) CBC WITH AUTOMATED DIFF Collection Time: 10/21/18 10:40 AM  
Result Value Ref Range WBC 11.3 (H) 4.3 - 11.1 K/uL  
 RBC 4.38 4.05 - 5.2 M/uL  
 HGB 12.8 11.7 - 15.4 g/dL HCT 40.8 35.8 - 46.3 % MCV 93.2 79.6 - 97.8 FL  
 MCH 29.2 26.1 - 32.9 PG  
 MCHC 31.4 31.4 - 35.0 g/dL  
 RDW 12.5 % PLATELET 321 342 - 014 K/uL MPV 9.4 9.4 - 12.3 FL ABSOLUTE NRBC 0.00 0.0 - 0.2 K/uL  
 DF AUTOMATED NEUTROPHILS 77 43 - 78 % LYMPHOCYTES 13 13 - 44 % MONOCYTES 10 4.0 - 12.0 % EOSINOPHILS 0 (L) 0.5 - 7.8 % BASOPHILS 0 0.0 - 2.0 % IMMATURE GRANULOCYTES 0 0.0 - 5.0 %  
 ABS. NEUTROPHILS 8.6 (H) 1.7 - 8.2 K/UL  
 ABS. LYMPHOCYTES 1.4 0.5 - 4.6 K/UL  
 ABS. MONOCYTES 1.1 0.1 - 1.3 K/UL  
 ABS. EOSINOPHILS 0.0 0.0 - 0.8 K/UL  
 ABS. BASOPHILS 0.1 0.0 - 0.2 K/UL  
 ABS. IMM. GRANS. 0.0 0.0 - 0.5 K/UL METABOLIC PANEL, COMPREHENSIVE Collection Time: 10/21/18 10:40 AM  
Result Value Ref Range Sodium 138 136 - 145 mmol/L Potassium 4.0 3.5 - 5.1 mmol/L Chloride 106 98 - 107 mmol/L  
 CO2 25 21 - 32 mmol/L Anion gap 7 mmol/L Glucose 99 65 - 100 mg/dL BUN 8 8 - 23 MG/DL Creatinine 0.75 0.6 - 1.0 MG/DL  
 GFR est AA >60 >60 ml/min/1.73m2 GFR est non-AA >60 ml/min/1.73m2 Calcium 8.5 8.3 - 10.4 MG/DL Bilirubin, total 0.6 0.2 - 1.1 MG/DL  
 ALT (SGPT) 27 12 - 65 U/L  
 AST (SGOT) 18 15 - 37 U/L Alk. phosphatase 79 50 - 130 U/L Protein, total 6.9 g/dL Albumin 3.3 3.2 - 4.6 g/dL Globulin 3.6 (H) 2.3 - 3.5 g/dL A-G Ratio 0.9 LIPASE Collection Time: 10/21/18 10:40 AM  
Result Value Ref Range Lipase 132 73 - 273 U/L  
METABOLIC PANEL, BASIC Collection Time: 10/22/18  6:41 AM  
Result Value Ref Range Sodium 142 136 - 145 mmol/L Potassium 3.4 (L) 3.5 - 5.1 mmol/L Chloride 113 (H) 98 - 107 mmol/L  
 CO2 23 21 - 32 mmol/L Anion gap 6 mmol/L Glucose 136 (H) 65 - 100 mg/dL BUN 4 (L) 8 - 23 MG/DL Creatinine 0.62 0.6 - 1.0 MG/DL  
 GFR est AA >60 >60 ml/min/1.73m2 GFR est non-AA >60 ml/min/1.73m2 Calcium 7.3 (L) 8.3 - 10.4 MG/DL Hospital Problems  Never Reviewed Codes Class Noted POA * (Principal) Diverticulitis of colon with perforation ICD-10-CM: K57.20 ICD-9-CM: 562.11  10/21/2018 Yes 1. Clear liquids. 2. Continue IVF'S 3. Doubt surgical intervention will be necessary.  
Nandini Juarez MD.

## 2018-10-22 NOTE — PROGRESS NOTES
Socioevironmental: 
SW met with patient who states that [place], but has an adult son who is \"in and out\" to check on her. Patient states that she's \"completely independent\" at home, and does not require any assistance with ADLs. Patient seemed offended by this question, wanted to know Mayo Jeffers do people think that about me? \" SW explained that she asks the same questions to every patient to screen for discharge needs. Patient verbalized understanding and appeared less upset.  
   
Ambulation: 
Patient states that she ambulates without assistive devices, denies any falls this year. States her last fall was in 1999 when she slipped in a bathroom at a cafe where the tile was wet. 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  
  
Primary Care: 
Patient sees Dr. Robbie Goff for primary care, last appointment was \"a month or two ago. \"   
  
Needs:  
Patient's son will be staying with her after discharge to help care for her. Patient is not homebound. No needs are identified at this time. Case management will continue to follow until discharge to help accommodate any that should arise. AGUSTIN Tabor  [facility] [email or web address]

## 2018-10-22 NOTE — PROGRESS NOTES
Up to bathroom with asst, voiding without difficulty and back to bed, stance and gait steady. AP 96, regular. Skin warm, dry. Lungs sounds clear, abdom soft, tender with active bowel sounds. Assessment noted. No c/o. No distress.

## 2018-10-22 NOTE — PROGRESS NOTES
Hospitalist Progress Note   
10/22/2018 Admit Date: 10/21/2018 10:06 AM  
NAME: Lenn Apley :  1950 MRN:  [id number] Attending: Ayesha aLnda MD 
PCP:  Hubert Jacobson MD 
 
 
Admitted for:diverticulitis with perforation SUBJECTIVE:  
Patient this morning doing better, feels hungry, pain is improving, well. No bowel movement having flatus. Hospital Course Ms Sean Oliveira is a 70year old female with no chronic medical illness, only on OTC vitamins. Who was relatively well till past 3 days when she began to have lower abdominal pain, cramping in nature initially 3/10 this has progressively got worse last evening, with sharp pains, that was not relieved with movement or aspirin taken. She has never had symptoms like this prior. She initially thought possible uti, but has no dysuria, or frequency, she has had formed bowel movements last one this morning, with no blood. No nausea no vomiting associated with it. Last colonoscopy within the past 2-3 years, no masses may have had polyps removed. Can not recall previous diagnosis of diverticulosis Review of Systems negative with exception of pertinent positives noted above Past medical history unchanged from H&P 
 
PHYSICAL EXAM  
 
Visit Vitals /73 (BP 1 Location: Right arm, BP Patient Position: At rest) Pulse 69 Temp 98.3 °F (36.8 °C) Resp 18 Ht 5' 6\" (1.676 m) Wt 77.1 kg (170 lb) SpO2 (!) 86% Breastfeeding? No  
BMI 27.44 kg/m² Temp (24hrs), Av.3 °F (36.8 °C), Min:97.2 °F (36.2 °C), Max:99.2 °F (37.3 °C) Oxygen Therapy O2 Sat (%): (!) 86 % (10/22/18 0840) Pulse via Oximetry: 74 beats per minute (10/21/18 1430) O2 Device: Room air (10/21/18 1001) Intake/Output Summary (Last 24 hours) at 10/22/2018 5178 Last data filed at 10/22/2018 0928 Gross per 24 hour Intake  Output 1350 ml Net -1350 ml General: No acute distress  mucous membranes pink and moist acyanotic anicteric Neck:  Supple full range of motion Lungs:  Air entry equal bilaterally, no crepitations rales rhonchi Heart:  Regular rate and rhythm,  No murmur, rub, or gallop Abdomen: Soft, Non distended, Non tender, no rebound guarding Positive bowel sounds Extremities: No cyanosis, clubbing or edema Neurologic:  No focal deficits Musculoskeletal: no   Joint swelling tenderness erythema Skin:  No erythema, rashes noted LAB No results for input(s): GLUCPOC in the last 72 hours. No lab exists for component: [place] Recent Labs 10/22/18 
2446 WBC 6.3 HGB 10.6* HCT 33.2*  
 Recent Labs 10/22/18 
0641 10/21/18 
1040  138  
K 3.4* 4.0  
* 106 CO2 23 25 * 99 BUN 4* 8  
CREA 0.62 0.75 CA 7.3* 8.5 ALB  --  3.3 TBILI  --  0.6 ALT  --  27 SGOT  --  18  
 
 
EKG and imaging reviewed personally by me Ct Abd Pelv W Cont Result Date: 10/21/2018 CT ABDOMEN AND PELVIS WITH CONTRAST HISTORY: lower abdominal pain. Concern for diverticulitis. TECHNIQUE: Helically acquired images were obtained from the domes of the diaphragms to the ischial tuberosities reconstructed at 5mm intervals after the uneventful administration of 125cc's of intravenous Isovue-370 in order to better evaluate the solid abdominal viscera and vascular structures. Oral contrast was not administered per the emergency imaging BMI protocol. Coronal reformatted images were submitted. Radiation dose reduction techniques were used for this study:  Our CT scanners use one or all of the following: Automated exposure control, adjustment of the mA and/or kVp according to patient's size, iterative reconstruction. COMPARISON: None. CT ABDOMEN: There are several low-density lesions within the liver, too small to characterize but statistically most likely representing cysts. . Scattered hepatic and stomach calcifications are also present suggesting prior granulomatous disease. A left lower lobe granuloma is also present. The pancreas, adrenal glands and kidneys are unremarkable. No adenopathy or ascites is present. There are no inflammatory changes. There are mild atherosclerotic changes involving the abdominal aorta. There is a mild remote compression deformity at T12. CT PELVIS: There are few sigmoid diverticula. There are mild inflammatory changes associated with a diverticulum with few tiny foci of extraluminal gas most compatible with diverticulitis and contained perforation. There is no abscess or bowel obstruction. There is streak artifact from left hip pinning. No adenopathy or ascites is present. IMPRESSION: 1. Sigmoid diverticulitis with contained/focal perforation. 2. Hepatic low density lesions most likely representing cysts. No results found for this or any previous visit. XR Results (most recent): No results found for this or any previous visit. Active problems Active Hospital Problems Diagnosis Date Noted  Diverticulitis of colon with perforation 10/21/2018 ASSESSMENT  AND PLAN  
  
· Acute diverticulitis with perforation - will continue IV antibiotics today, surgery consult appreciated, for clear liquids today, will continue current treatment, advance diet tomorrow likely and may transition to oral antibiotics in near future for likely home in 1-2 days. DVT Prophylaxis: lmwh Patient remains high risk for decompensation, given acute diverticulitis with perforation Signed By: Lisa Siegel MD   
 October 22, 2018

## 2018-10-23 VITALS
OXYGEN SATURATION: 94 % | WEIGHT: 170 LBS | HEIGHT: 66 IN | BODY MASS INDEX: 27.32 KG/M2 | HEART RATE: 70 BPM | DIASTOLIC BLOOD PRESSURE: 76 MMHG | RESPIRATION RATE: 20 BRPM | SYSTOLIC BLOOD PRESSURE: 128 MMHG | TEMPERATURE: 98.6 F

## 2018-10-23 LAB
ANION GAP SERPL CALC-SCNC: 8 MMOL/L
BUN SERPL-MCNC: 1 MG/DL (ref 8–23)
CALCIUM SERPL-MCNC: 8 MG/DL (ref 8.3–10.4)
CHLORIDE SERPL-SCNC: 111 MMOL/L (ref 98–107)
CO2 SERPL-SCNC: 24 MMOL/L (ref 21–32)
CREAT SERPL-MCNC: 0.65 MG/DL (ref 0.6–1)
ERYTHROCYTE [DISTWIDTH] IN BLOOD BY AUTOMATED COUNT: 12.6 %
GLUCOSE SERPL-MCNC: 109 MG/DL (ref 65–100)
HCT VFR BLD AUTO: 36.3 % (ref 35.8–46.3)
HGB BLD-MCNC: 11.3 G/DL (ref 11.7–15.4)
MCH RBC QN AUTO: 29.5 PG (ref 26.1–32.9)
MCHC RBC AUTO-ENTMCNC: 31.1 G/DL (ref 31.4–35)
MCV RBC AUTO: 94.8 FL (ref 79.6–97.8)
NRBC # BLD: 0 K/UL (ref 0–0.2)
PLATELET # BLD AUTO: 181 K/UL (ref 150–450)
PMV BLD AUTO: 9.6 FL (ref 9.4–12.3)
POTASSIUM SERPL-SCNC: 3.4 MMOL/L (ref 3.5–5.1)
RBC # BLD AUTO: 3.83 M/UL (ref 4.05–5.2)
SODIUM SERPL-SCNC: 143 MMOL/L (ref 136–145)
WBC # BLD AUTO: 5.1 K/UL (ref 4.3–11.1)

## 2018-10-23 PROCEDURE — 74011250637 HC RX REV CODE- 250/637: Performed by: INTERNAL MEDICINE

## 2018-10-23 PROCEDURE — 80048 BASIC METABOLIC PNL TOTAL CA: CPT

## 2018-10-23 PROCEDURE — 36415 COLL VENOUS BLD VENIPUNCTURE: CPT

## 2018-10-23 PROCEDURE — 85027 COMPLETE CBC AUTOMATED: CPT

## 2018-10-23 RX ORDER — METRONIDAZOLE 500 MG/1
500 TABLET ORAL EVERY 12 HOURS
Qty: 22 TAB | Refills: 0 | Status: SHIPPED | OUTPATIENT
Start: 2018-10-23 | End: 2018-11-03

## 2018-10-23 RX ORDER — CIPROFLOXACIN 500 MG/1
500 TABLET ORAL EVERY 12 HOURS
Status: DISCONTINUED | OUTPATIENT
Start: 2018-10-23 | End: 2018-10-23 | Stop reason: HOSPADM

## 2018-10-23 RX ORDER — METRONIDAZOLE 500 MG/1
500 TABLET ORAL EVERY 12 HOURS
Status: DISCONTINUED | OUTPATIENT
Start: 2018-10-23 | End: 2018-10-23 | Stop reason: HOSPADM

## 2018-10-23 RX ORDER — CIPROFLOXACIN 500 MG/1
500 TABLET ORAL EVERY 12 HOURS
Qty: 22 TAB | Refills: 0 | Status: SHIPPED | OUTPATIENT
Start: 2018-10-23 | End: 2018-11-03

## 2018-10-23 RX ORDER — HYDROCODONE BITARTRATE AND ACETAMINOPHEN 5; 325 MG/1; MG/1
1 TABLET ORAL
Qty: 20 TAB | Refills: 0 | Status: SHIPPED | OUTPATIENT
Start: 2018-10-23

## 2018-10-23 RX ADMIN — METRONIDAZOLE 500 MG: 500 TABLET ORAL at 10:16

## 2018-10-23 RX ADMIN — CIPROFLOXACIN HYDROCHLORIDE 500 MG: 500 TABLET, FILM COATED ORAL at 10:16

## 2018-10-23 RX ADMIN — Medication 10 ML: at 05:26

## 2018-10-23 NOTE — PROGRESS NOTES
PM assessment completed. PT AAO x 4 sitting up in bed. Respirations are present, normal and unlabored. IV site is CDI with fluids infusing. Bed is low and locked with call light in reach. Will continue to monitor

## 2018-10-23 NOTE — DISCHARGE SUMMARY
Hospitalist Discharge Summary     Patient ID:  Lucero Hicks  [id number]  70 y.o.  1950  Admit date: 10/21/2018 10:06 AM  Discharge date and time: 10/23/2018  Attending: Miriam Otero MD  PCP:  Laury Ritchie MD  Treatment Team: Attending Provider: Miriam Otero MD; Consulting Provider: Dora Velasquez MD; Utilization Review: Gabriella Guillen; Care Manager: García Baires    Principal Diagnosis Diverticulitis of colon with perforation    Hospital Problems as of 10/23/2018 Never Reviewed          Codes Class Noted - Resolved POA    * (Principal) Diverticulitis of colon with perforation ICD-10-CM: K57.20  ICD-9-CM: 562.11  10/21/2018 - Present Yes                  Hospital Course:  70year old female with no chronic medical illness, only on OTC vitamins who was relatively well till past 3 days when she began to have lower abdominal pain, cramping in nature initially 3/10 this has progressively got worse with sharp pains that was not relieved with movement or aspirin taken. She never had symptoms like this prior. She was found to have acute diverticulitis with small perforation so she was admitted to the floor and started on Cipro/Flagyl. No nausea no vomiting associated with it. Last colonoscopy within the past 2-3 years, no masses may have had polyps removed. Can not recall previous diagnosis of diverticulosis. General surgery was consulted who wanted bowel rest and antibiotics. She improved and tolerated a full liquid diet. She has a normal BM. She was discharged home in stable condition to complete a course of antibiotics.          Significant Diagnostic Studies:    Labs: Results:       Chemistry Recent Labs     10/23/18  0530 10/22/18  0641 10/21/18  1040   * 136* 99    142 138   K 3.4* 3.4* 4.0   * 113* 106   CO2 24 23 25   BUN 1* 4* 8   CREA 0.65 0.62 0.75   CA 8.0* 7.3* 8.5   AGAP 8 6 7   AP  --   --  79   TP  --   --  6.9   ALB  --   --  3.3   GLOB  --   --  3.6* AGRAT  --   --  0.9      CBC w/Diff Recent Labs     10/23/18  0530 10/22/18  0641 10/21/18  1040   WBC 5.1 6.3 11.3*   RBC 3.83* 3.51* 4.38   HGB 11.3* 10.6* 12.8   HCT 36.3 33.2* 40.8    173 202   GRANS  --   --  77   LYMPH  --   --  13   EOS  --   --  0*      Cardiac Enzymes No results for input(s): CPK, CKND1, STACIE in the last 72 hours. No lab exists for component: CKRMB, TROIP   Coagulation No results for input(s): PTP, INR, APTT in the last 72 hours. No lab exists for component: INREXT    Lipid Panel No results found for: CHOL, CHOLPOCT, CHOLX, CHLST, CHOLV, 376619, HDL, LDL, LDLC, DLDLP, 808528, VLDLC, VLDL, TGLX, TRIGL, TRIGP, TGLPOCT, CHHD, CHHDX   BNP No results for input(s): BNPP in the last 72 hours. Liver Enzymes Recent Labs     10/21/18  1040   TP 6.9   ALB 3.3   AP 79   SGOT 18      Thyroid Studies No results found for: T4, T3U, TSH, TSHEXT         Imaging:  Ct Abd Pelv W Cont    Result Date: 10/21/2018  IMPRESSION: 1. Sigmoid diverticulitis with contained/focal perforation. 2. Hepatic low density lesions most likely representing cysts. Microbiology/Cultures: All Micro Results     None          Discharge Exam:  Visit Vitals  /76 (BP 1 Location: Right arm, BP Patient Position: At rest)   Pulse 70   Temp 98.6 °F (37 °C)   Resp 20   Ht 5' 6\" (1.676 m)   Wt 77.1 kg (170 lb)   SpO2 94%   Breastfeeding? No   BMI 27.44 kg/m²     General appearance: alert, cooperative, no distress, appears stated age  Lungs: clear to auscultation bilaterally  Heart: regular rate and rhythm, S1, S2 normal, no murmur, click, rub or gallop  Abdomen: soft, non-tender.  Bowel sounds normal. No masses,  no organomegaly  Extremities: no cyanosis or edema  Neurologic: Grossly normal    Disposition: home  Discharge Condition: stable  Patient Instructions:   Current Discharge Medication List      START taking these medications    Details   ciprofloxacin HCl (CIPRO) 500 mg tablet Take 1 Tab by mouth every twelve (12) hours for 11 days. Qty: 22 Tab, Refills: 0      metroNIDAZOLE (FLAGYL) 500 mg tablet Take 1 Tab by mouth every twelve (12) hours for 11 days. Qty: 22 Tab, Refills: 0      HYDROcodone-acetaminophen (NORCO) 5-325 mg per tablet Take 1 Tab by mouth every six (6) hours as needed for Pain. Max Daily Amount: 4 Tabs.   Qty: 20 Tab, Refills: 0    Associated Diagnoses: Acute diverticulitis             Activity: Activity as tolerated  Diet: Regular Diet  Wound Care: None needed    Follow-up  ·   Dr. Severo Ryan in one week  Time spent to discharge patient 35 minutes  Signed:  Christian Cedillo DO  10/23/2018  9:12 AM

## 2018-10-23 NOTE — PROGRESS NOTES
Discharge instructions were reviewed with the patient and family. Opportunity for questions given. Patient verbalized understanding of discharge and follow up instructions. PIV was removed. Prescriptions provided. Patient getting dressed and will call when ready to be taken out.

## 2018-10-23 NOTE — PROGRESS NOTES
General Surgery Progress Note 
 
10/23/2018 Admit Date: 10/21/2018 Subjective:  
 
Surgery Patient tolerated liquids without nausea or vomiting. She had a bowel movement yesterday. Objective:  
 
Visit Vitals /76 (BP 1 Location: Right arm, BP Patient Position: At rest) Pulse 70 Temp 98.6 °F (37 °C) Resp 20 Ht 5' 6\" (1.676 m) Wt 170 lb (77.1 kg) SpO2 94% Breastfeeding? No  
BMI 27.44 kg/m² Intake/Output Summary (Last 24 hours) at 10/23/2018 2059 Last data filed at 10/23/2018 7401 Gross per 24 hour Intake 1692 ml Output 3100 ml Net -1408 ml EXAM:  ABD soft, no significant LLQ tenderness, active BS'S. Positive BM. Data Review Recent Results (from the past 24 hour(s)) METABOLIC PANEL, BASIC Collection Time: 10/23/18  5:30 AM  
Result Value Ref Range Sodium 143 136 - 145 mmol/L Potassium 3.4 (L) 3.5 - 5.1 mmol/L Chloride 111 (H) 98 - 107 mmol/L  
 CO2 24 21 - 32 mmol/L Anion gap 8 mmol/L Glucose 109 (H) 65 - 100 mg/dL BUN 1 (L) 8 - 23 MG/DL Creatinine 0.65 0.6 - 1.0 MG/DL  
 GFR est AA >60 >60 ml/min/1.73m2 GFR est non-AA >60 ml/min/1.73m2 Calcium 8.0 (L) 8.3 - 10.4 MG/DL  
CBC W/O DIFF Collection Time: 10/23/18  5:30 AM  
Result Value Ref Range WBC 5.1 4.3 - 11.1 K/uL  
 RBC 3.83 (L) 4.05 - 5.2 M/uL  
 HGB 11.3 (L) 11.7 - 15.4 g/dL HCT 36.3 35.8 - 46.3 % MCV 94.8 79.6 - 97.8 FL  
 MCH 29.5 26.1 - 32.9 PG  
 MCHC 31.1 (L) 31.4 - 35.0 g/dL  
 RDW 12.6 % PLATELET 538 805 - 132 K/uL MPV 9.6 9.4 - 12.3 FL ABSOLUTE NRBC 0.00 0.0 - 0.2 K/uL Hospital Problems  Never Reviewed Codes Class Noted POA * (Principal) Diverticulitis of colon with perforation ICD-10-CM: K57.20 ICD-9-CM: 562.11  10/21/2018 Yes 1. Advance to full liquids. 2. Change to Cipro/Flagyl by mouth 3. No surgical intervention necessary 4. May discharge from my point of view at any time.  
Monica Galicia MD.

## 2018-10-23 NOTE — PROGRESS NOTES
Pt complain of red rash on BUE, BLE and abdomen. Not itching . Contacted provider. Received order to D/C zosyn and switch to cipro iv and flagyl IV. Also give prn benadryl. Will continue to monitor. Addendum Red rash improved after benadryl. Will continue to monitor.

## 2018-10-23 NOTE — PROGRESS NOTES
PT sitting up in bed AAO x 4 eating breakfast. Normal and unlabored respirations are present. Denies pain. Bed is low and locked with call light in reach. IV site is CDI and infusing. Will continue to monitor.

## 2018-10-23 NOTE — DISCHARGE INSTRUCTIONS
DISCHARGE SUMMARY from Nurse    PATIENT INSTRUCTIONS:    After general anesthesia or intravenous sedation, for 24 hours or while taking prescription Narcotics:  · Limit your activities  · Do not drive and operate hazardous machinery  · Do not make important personal or business decisions  · Do  not drink alcoholic beverages  · If you have not urinated within 8 hours after discharge, please contact your surgeon on call. Report the following to your surgeon:  · Excessive pain, swelling, redness or odor of or around the surgical area  · Temperature over 100.5  · Nausea and vomiting lasting longer than 4 hours or if unable to take medications  · Any signs of decreased circulation or nerve impairment to extremity: change in color, persistent  numbness, tingling, coldness or increase pain  · Any questions    What to do at Home:  Recommended activity: Activity as tolerated, full liquid diet, advance as tolerated to regular diet, follow up as scheduled with your PCP    If you experience any of the following symptoms abdominal pain/nausea/vomiting, fever, other worrisome symptoms, please follow up with your primary doctor or return to ER. *  Please give a list of your current medications to your Primary Care Provider. *  Please update this list whenever your medications are discontinued, doses are      changed, or new medications (including over-the-counter products) are added. *  Please carry medication information at all times in case of emergency situations. These are general instructions for a healthy lifestyle:    No smoking/ No tobacco products/ Avoid exposure to second hand smoke  Surgeon General's Warning:  Quitting smoking now greatly reduces serious risk to your health.     Obesity, smoking, and sedentary lifestyle greatly increases your risk for illness    A healthy diet, regular physical exercise & weight monitoring are important for maintaining a healthy lifestyle    You may be retaining fluid if you have a history of heart failure or if you experience any of the following symptoms:  Weight gain of 3 pounds or more overnight or 5 pounds in a week, increased swelling in our hands or feet or shortness of breath while lying flat in bed. Please call your doctor as soon as you notice any of these symptoms; do not wait until your next office visit. Recognize signs and symptoms of STROKE:    F-face looks uneven    A-arms unable to move or move unevenly    S-speech slurred or non-existent    T-time-call 911 as soon as signs and symptoms begin-DO NOT go       Back to bed or wait to see if you get better-TIME IS BRAIN. Warning Signs of HEART ATTACK     Call 911 if you have these symptoms:   Chest discomfort. Most heart attacks involve discomfort in the center of the chest that lasts more than a few minutes, or that goes away and comes back. It can feel like uncomfortable pressure, squeezing, fullness, or pain.  Discomfort in other areas of the upper body. Symptoms can include pain or discomfort in one or both arms, the back, neck, jaw, or stomach.  Shortness of breath with or without chest discomfort.  Other signs may include breaking out in a cold sweat, nausea, or lightheadedness. Don't wait more than five minutes to call 911 - MINUTES MATTER! Fast action can save your life. Calling 911 is almost always the fastest way to get lifesaving treatment. Emergency Medical Services staff can begin treatment when they arrive -- up to an hour sooner than if someone gets to the hospital by car. The discharge information has been reviewed with the patient. The patient verbalized understanding. Discharge medications reviewed with the patient and appropriate educational materials and side effects teaching were provided.   ___________________________________________________________________________________________________________________________________           Diverticulitis: Care Instructions  Your Care Instructions    Diverticulitis occurs when pouches form in the wall of the colon and become inflamed or infected. It can be very painful. Doctors aren't sure what causes diverticulitis. There is no proof that foods such as nuts, seeds, or berries cause it or make it worse. A low-fiber diet may cause the colon to work harder to push stool forward. Pouches may form because of this extra work. It may be hard to think about healthy eating while you're in pain. But as you recover, you might think about how you can use healthy eating for overall better health. Healthy eating may help you avoid future attacks. Follow-up care is a key part of your treatment and safety. Be sure to make and go to all appointments, and call your doctor if you are having problems. It's also a good idea to know your test results and keep a list of the medicines you take. How can you care for yourself at home? · Drink plenty of fluids, enough so that your urine is light yellow or clear like water. If you have kidney, heart, or liver disease and have to limit fluids, talk with your doctor before you increase the amount of fluids you drink. · Stick to liquids or a bland diet (plain rice, bananas, dry toast or crackers, applesauce) until you are feeling better. Then you can return to regular foods and gradually increase the amount of fiber in your diet. · Use a heating pad set on low on your belly to relieve mild cramps and pain. · Get extra rest until you are feeling better. · Be safe with medicines. Read and follow all instructions on the label. ? If the doctor gave you a prescription medicine for pain, take it as prescribed. ? If you are not taking a prescription pain medicine, ask your doctor if you can take an over-the-counter medicine. · If your doctor prescribed antibiotics, take them as directed. Do not stop taking them just because you feel better. You need to take the full course of antibiotics.   To prevent future attacks of diverticulitis  · Avoid constipation:  ? Include fruits, vegetables, beans, and whole grains in your diet each day. These foods are high in fiber. ? Drink plenty of fluids, enough so that your urine is light yellow or clear like water. If you have kidney, heart, or liver disease and have to limit fluids, talk with your doctor before you increase the amount of fluids you drink. ? Get some exercise every day. Build up slowly to 30 to 60 minutes a day on 5 or more days of the week. ? Take a fiber supplement, such as Citrucel or Metamucil, every day if needed. Read and follow all instructions on the label. ? Schedule time each day for a bowel movement. Having a daily routine may help. Take your time and do not strain when having a bowel movement. When should you call for help? Call your doctor now or seek immediate medical care if:    · You have a fever.     · You are vomiting.     · You have new or worse belly pain.     · You cannot pass stools or gas.    Watch closely for changes in your health, and be sure to contact your doctor if you have any problems. Where can you learn more? Go to http://trav-viridiana.info/. Enter H901 in the search box to learn more about \"Diverticulitis: Care Instructions. \"  Current as of: March 28, 2018  Content Version: 11.8  © 3601-1403 [employer]. Care instructions adapted under license by [employer] (which disclaims liability or warranty for this information). If you have questions about a medical condition or this instruction, always ask your healthcare professional. [place] [address] any warranty or liability for your use of this information.

## 2018-10-24 ENCOUNTER — PATIENT OUTREACH (OUTPATIENT)
Dept: CASE MANAGEMENT | Age: 68
End: 2018-10-24

## 2018-10-24 NOTE — PROGRESS NOTES
Transition of Care Discharge Follow-up Questionnaire Date/Time of Call: 
 October 24, 2018 2:28PM  
What was the patient hospitalized for? Diverticulitis of colon with perforation Does the patient understand his/her diagnosis and/or treatment and what happened during the hospitalization? Care Coordinator spoke with patient Mrs. Ally Chacon who agreed to [facility] of [facility]. Patient states understanding of diagnosis and treatment plan during hospitalization. Did the patient receive discharge instructions? Yes  
CM Assessed Risk for Readmission:  
 
 
 
 
 
Patient stated Risk for Readmission: Low to Moderate risk for readmit related to complications fromDiverticulitis of colon with perforation. Patient states she is doing very well and will not readmit. Review any discharge instructions (see discharge instructions/AVS in [facility]). Ask patient if they understand these. Do they have any questions? Care Coordinator reviewed discharge medications, diet and discharge instructions with patient who verbalized understanding of discharge instructions. Patient educated on the importance of medication compliance and reporting medication side effects to PCP/Specialist.  
  
Were home services ordered (nursing, PT, OT, ST, etc.)? No home health services ordered. If so, has the first visit occurred? If not, why? (Assist with coordination of services if necessary. ) 
 NA Was any DME ordered? No durable medical equipment ordered. If so, has it been received? If not, why?  (Assist patient in obtaining DME orders &/or equipment if necessary. ) NA Complete a review of all medications (new, continued and discontinued meds per the D/C instructions and medication tab in [facility]). Care Coordinator reviewed  medications with patient per [facility] who verbalized understanding. Start: ciprofloxacin HCl (CIPRO) 500 mg tablet metroNIDAZOLE (FLAGYL) 500 mg tablet HYDROcodone-acetaminophen (NORCO) 5-325 mg per tablet Were all new prescriptions filled? If not, why?  (Assist patient in obtaining medications if necessary  escalate for CCM &/or SW if ongoing issues are verbalized by pt or anticipated) Yes Does the patient understand the purpose and dosing instructions for all medications? (If patient has questions, provide explanation and education.) Patient states understanding of current medications and dosing instructions. Does the patient have any problems in performing ADLs? (If patient is unable to perform ADLs  what is the limiting factor(s)? Do they have a support system that can assist? If no support system is present, discuss possible assistance that they may be able to obtain. Escalate for CCM/SW if ongoing issues are verbalized by pt or anticipated) Patient states she is independent with ADL's and ambulation. Patient states she is doing very well and states her son checks on her daily and will assist her when needed. Does the patient have all follow-up appointments scheduled? 7 day f/up with PCP?  
(f/up with PCP may be w/in 14 days if patient has a f/up with their specialist w/in 7 days) 7-14 day f/up with specialist?  
(or per discharge instructions) If f/up has not been made  what actions has the care coordinator made to accomplish this? Has transportation been arranged? Yes Follow up with Kasi Rowell. (PCP) on 10/30/2018 @ 10:00AM. NA 
 
 
NA Yes Any other questions or concerns expressed by the patient? No further needs identified: Patient instructed to call Care Coordinator if further questions or concerns arise Schedule next appointment with LUIGI Lee or refer to RN Case Manager/ per the workflow guidelines. When is care coordinators next follow-up call scheduled? If referred for CCM  what RN care manager was the referral assigned? NA Patient states she has no additional needs and declined follow up call. Care Coordinator provided contact information if assistance is needed for concerns or questions. NA  
ELEAZAR Call Completed By: MARK ANTHONY Traylor [facility] Community Care Coordinator This note will not be viewable in [address]

## 2020-01-10 ENCOUNTER — HOSPITAL ENCOUNTER (OUTPATIENT)
Dept: MAMMOGRAPHY | Age: 70
Discharge: HOME | End: 2020-01-10
Attending: FAMILY MEDICINE
Payer: MEDICARE

## 2020-01-10 DIAGNOSIS — Z12.31 ENCOUNTER FOR SCREENING MAMMOGRAM FOR MALIGNANT NEOPLASM OF BREAST: ICD-10-CM

## 2020-01-10 PROCEDURE — 77063 BREAST TOMOSYNTHESIS BI: CPT

## 2021-10-22 ENCOUNTER — HOSPITAL ENCOUNTER (OUTPATIENT)
Dept: MAMMOGRAPHY | Age: 71
Discharge: HOME | End: 2021-10-22
Attending: FAMILY MEDICINE
Payer: MEDICARE

## 2021-10-22 ENCOUNTER — TRANSCRIBE ORDER (OUTPATIENT)
Dept: REGISTRATION | Age: 71
End: 2021-10-22

## 2021-10-22 DIAGNOSIS — Z12.31 SCREENING MAMMOGRAM FOR HIGH-RISK PATIENT: ICD-10-CM

## 2021-10-22 DIAGNOSIS — Z12.31 SCREENING MAMMOGRAM FOR HIGH-RISK PATIENT: Primary | ICD-10-CM

## 2021-10-22 PROCEDURE — 77063 BREAST TOMOSYNTHESIS BI: CPT

## 2022-03-19 PROBLEM — K57.20 DIVERTICULITIS OF COLON WITH PERFORATION: Status: ACTIVE | Noted: 2018-10-21

## 2022-07-12 NOTE — ED TRIAGE NOTES
Pt in states lower abdominal pain x 2 days. States urinary urgency. States no nausea or vomiting. States normal bowel movement today. oriented to person, place and time , normal sensation , short and long term memory intact

## 2022-10-24 ENCOUNTER — APPOINTMENT (OUTPATIENT)
Dept: CT IMAGING | Age: 72
End: 2022-10-24
Payer: MEDICARE

## 2022-10-24 ENCOUNTER — HOSPITAL ENCOUNTER (EMERGENCY)
Age: 72
Discharge: HOME | End: 2022-10-24
Attending: STUDENT IN AN ORGANIZED HEALTH CARE EDUCATION/TRAINING PROGRAM
Payer: MEDICARE

## 2022-10-24 VITALS
SYSTOLIC BLOOD PRESSURE: 157 MMHG | WEIGHT: 170 LBS | DIASTOLIC BLOOD PRESSURE: 56 MMHG | RESPIRATION RATE: 14 BRPM | OXYGEN SATURATION: 100 % | HEART RATE: 80 BPM | BODY MASS INDEX: 27.32 KG/M2 | TEMPERATURE: 98.4 F | HEIGHT: 66 IN

## 2022-10-24 DIAGNOSIS — R10.13 DYSPEPSIA: Primary | ICD-10-CM

## 2022-10-24 LAB
ALBUMIN SERPL-MCNC: 4.4 G/DL (ref 3.2–4.6)
ALBUMIN/GLOB SERPL: 1.7 {RATIO} (ref 0.4–1.6)
ALP SERPL-CCNC: 85 U/L (ref 45–117)
ALT SERPL-CCNC: 14 U/L (ref 13–61)
ANION GAP SERPL CALC-SCNC: 12 MMOL/L (ref 2–11)
APPEARANCE UR: CLEAR
AST SERPL-CCNC: 20 U/L (ref 15–37)
BACTERIA URNS QL MICRO: 0 /HPF
BASOPHILS # BLD: 0 K/UL (ref 0–0.2)
BASOPHILS NFR BLD: 1 % (ref 0–2)
BILIRUB SERPL-MCNC: 0.3 MG/DL (ref 0.2–1.1)
BILIRUB UR QL: NEGATIVE
BUN SERPL-MCNC: 17 MG/DL (ref 7–18)
CALCIUM SERPL-MCNC: 9 MG/DL (ref 8.3–10.4)
CASTS URNS QL MICRO: 0 /LPF
CHLORIDE SERPL-SCNC: 102 MMOL/L (ref 98–107)
CO2 SERPL-SCNC: 24 MMOL/L (ref 21–32)
COLOR UR: YELLOW
CREAT SERPL-MCNC: 0.59 MG/DL (ref 0.6–1)
CRYSTALS URNS QL MICRO: 0 /LPF
DIFFERENTIAL METHOD BLD: ABNORMAL
EOSINOPHIL # BLD: 0.1 K/UL (ref 0–0.8)
EOSINOPHIL NFR BLD: 1 % (ref 0.5–7.8)
EPI CELLS #/AREA URNS HPF: 0 /HPF
ERYTHROCYTE [DISTWIDTH] IN BLOOD BY AUTOMATED COUNT: 12.7 % (ref 11.9–14.6)
GLOBULIN SER CALC-MCNC: 2.6 G/DL (ref 2.8–4.5)
GLUCOSE SERPL-MCNC: 118 MG/DL (ref 65–100)
GLUCOSE UR STRIP.AUTO-MCNC: NEGATIVE MG/DL
HCT VFR BLD AUTO: 40.9 % (ref 35.8–46.3)
HGB BLD-MCNC: 13.6 G/DL (ref 11.7–15.4)
HGB UR QL STRIP: ABNORMAL
IMM GRANULOCYTES # BLD AUTO: 0 K/UL (ref 0–0.5)
IMM GRANULOCYTES NFR BLD AUTO: 0 % (ref 0–5)
KETONES UR QL STRIP.AUTO: NEGATIVE MG/DL
LEUKOCYTE ESTERASE UR QL STRIP.AUTO: NEGATIVE
LIPASE SERPL-CCNC: 32 U/L (ref 13–60)
LYMPHOCYTES # BLD: 1.4 K/UL (ref 0.5–4.6)
LYMPHOCYTES NFR BLD: 17 % (ref 13–44)
MCH RBC QN AUTO: 31.1 PG (ref 26.1–32.9)
MCHC RBC AUTO-ENTMCNC: 33.3 G/DL (ref 31.4–35)
MCV RBC AUTO: 93.4 FL (ref 82–102)
MONOCYTES # BLD: 0.6 K/UL (ref 0.1–1.3)
MONOCYTES NFR BLD: 7 % (ref 4–12)
MUCOUS THREADS URNS QL MICRO: 0 /LPF
NEUTS SEG # BLD: 6.1 K/UL (ref 1.7–8.2)
NEUTS SEG NFR BLD: 74 % (ref 43–78)
NITRITE UR QL STRIP.AUTO: NEGATIVE
NRBC # BLD: 0 K/UL (ref 0–0.2)
OTHER OBSERVATIONS: NORMAL
PH UR STRIP: 6.5 [PH] (ref 5–9)
PLATELET # BLD AUTO: 245 K/UL (ref 150–450)
PMV BLD AUTO: 8.9 FL (ref 9.4–12.3)
POTASSIUM SERPL-SCNC: 4.4 MMOL/L (ref 3.5–5.1)
PROT SERPL-MCNC: 7 G/DL (ref 6.4–8.2)
PROT UR STRIP-MCNC: NEGATIVE MG/DL
RBC # BLD AUTO: 4.38 M/UL (ref 4.05–5.2)
RBC #/AREA URNS HPF: NORMAL /HPF
SODIUM SERPL-SCNC: 138 MMOL/L (ref 133–143)
SP GR UR REFRACTOMETRY: >=1.03 (ref 1–1.02)
UROBILINOGEN UR QL STRIP.AUTO: 0.2 EU/DL (ref 0.2–1)
WBC # BLD AUTO: 8.2 K/UL (ref 4.3–11.1)
WBC URNS QL MICRO: 0 /HPF

## 2022-10-24 PROCEDURE — 6360000002 HC RX W HCPCS: Performed by: STUDENT IN AN ORGANIZED HEALTH CARE EDUCATION/TRAINING PROGRAM

## 2022-10-24 PROCEDURE — 96376 TX/PRO/DX INJ SAME DRUG ADON: CPT

## 2022-10-24 PROCEDURE — 6370000000 HC RX 637 (ALT 250 FOR IP): Performed by: STUDENT IN AN ORGANIZED HEALTH CARE EDUCATION/TRAINING PROGRAM

## 2022-10-24 PROCEDURE — 99285 EMERGENCY DEPT VISIT HI MDM: CPT

## 2022-10-24 PROCEDURE — C9113 INJ PANTOPRAZOLE SODIUM, VIA: HCPCS | Performed by: STUDENT IN AN ORGANIZED HEALTH CARE EDUCATION/TRAINING PROGRAM

## 2022-10-24 PROCEDURE — 96361 HYDRATE IV INFUSION ADD-ON: CPT

## 2022-10-24 PROCEDURE — 2580000003 HC RX 258: Performed by: STUDENT IN AN ORGANIZED HEALTH CARE EDUCATION/TRAINING PROGRAM

## 2022-10-24 PROCEDURE — 6360000004 HC RX CONTRAST MEDICATION: Performed by: STUDENT IN AN ORGANIZED HEALTH CARE EDUCATION/TRAINING PROGRAM

## 2022-10-24 PROCEDURE — 81001 URINALYSIS AUTO W/SCOPE: CPT

## 2022-10-24 PROCEDURE — 83690 ASSAY OF LIPASE: CPT

## 2022-10-24 PROCEDURE — 80053 COMPREHEN METABOLIC PANEL: CPT

## 2022-10-24 PROCEDURE — 74177 CT ABD & PELVIS W/CONTRAST: CPT

## 2022-10-24 PROCEDURE — 85025 COMPLETE CBC W/AUTO DIFF WBC: CPT

## 2022-10-24 PROCEDURE — A4216 STERILE WATER/SALINE, 10 ML: HCPCS | Performed by: STUDENT IN AN ORGANIZED HEALTH CARE EDUCATION/TRAINING PROGRAM

## 2022-10-24 PROCEDURE — 96375 TX/PRO/DX INJ NEW DRUG ADDON: CPT

## 2022-10-24 PROCEDURE — 96374 THER/PROPH/DIAG INJ IV PUSH: CPT

## 2022-10-24 RX ORDER — 0.9 % SODIUM CHLORIDE 0.9 %
1000 INTRAVENOUS SOLUTION INTRAVENOUS
Status: COMPLETED | OUTPATIENT
Start: 2022-10-24 | End: 2022-10-24

## 2022-10-24 RX ORDER — SODIUM CHLORIDE 0.9 % (FLUSH) 0.9 %
5-40 SYRINGE (ML) INJECTION 2 TIMES DAILY
Status: DISCONTINUED | OUTPATIENT
Start: 2022-10-24 | End: 2022-10-24 | Stop reason: HOSPADM

## 2022-10-24 RX ORDER — ONDANSETRON 4 MG/1
4 TABLET, FILM COATED ORAL 3 TIMES DAILY PRN
Qty: 15 TABLET | Refills: 2 | Status: SHIPPED | OUTPATIENT
Start: 2022-10-24

## 2022-10-24 RX ORDER — SUCRALFATE 1 G/1
1 TABLET ORAL 4 TIMES DAILY
Qty: 120 TABLET | Refills: 3 | Status: SHIPPED | OUTPATIENT
Start: 2022-10-24

## 2022-10-24 RX ORDER — OMEPRAZOLE 40 MG/1
40 CAPSULE, DELAYED RELEASE ORAL
Qty: 90 CAPSULE | Refills: 1 | Status: SHIPPED | OUTPATIENT
Start: 2022-10-24

## 2022-10-24 RX ORDER — MORPHINE SULFATE 4 MG/ML
4 INJECTION INTRAVENOUS ONCE
Status: COMPLETED | OUTPATIENT
Start: 2022-10-24 | End: 2022-10-24

## 2022-10-24 RX ORDER — ONDANSETRON 2 MG/ML
4 INJECTION INTRAMUSCULAR; INTRAVENOUS
Status: COMPLETED | OUTPATIENT
Start: 2022-10-24 | End: 2022-10-24

## 2022-10-24 RX ORDER — 0.9 % SODIUM CHLORIDE 0.9 %
100 INTRAVENOUS SOLUTION INTRAVENOUS ONCE
Status: COMPLETED | OUTPATIENT
Start: 2022-10-24 | End: 2022-10-24

## 2022-10-24 RX ORDER — MAGNESIUM HYDROXIDE/ALUMINUM HYDROXICE/SIMETHICONE 120; 1200; 1200 MG/30ML; MG/30ML; MG/30ML
30 SUSPENSION ORAL
Status: COMPLETED | OUTPATIENT
Start: 2022-10-24 | End: 2022-10-24

## 2022-10-24 RX ORDER — LIDOCAINE HYDROCHLORIDE 20 MG/ML
15 SOLUTION OROPHARYNGEAL
Status: COMPLETED | OUTPATIENT
Start: 2022-10-24 | End: 2022-10-24

## 2022-10-24 RX ADMIN — MORPHINE SULFATE 4 MG: 4 INJECTION INTRAVENOUS at 15:58

## 2022-10-24 RX ADMIN — LIDOCAINE HYDROCHLORIDE 15 ML: 20 SOLUTION ORAL at 18:15

## 2022-10-24 RX ADMIN — IOPAMIDOL 100 ML: 755 INJECTION, SOLUTION INTRAVENOUS at 16:32

## 2022-10-24 RX ADMIN — ALUMINUM HYDROXIDE, MAGNESIUM HYDROXIDE, AND SIMETHICONE 30 ML: 200; 200; 20 SUSPENSION ORAL at 18:15

## 2022-10-24 RX ADMIN — ONDANSETRON 4 MG: 2 INJECTION INTRAMUSCULAR; INTRAVENOUS at 15:59

## 2022-10-24 RX ADMIN — PANTOPRAZOLE SODIUM 40 MG: 40 INJECTION, POWDER, LYOPHILIZED, FOR SOLUTION INTRAVENOUS at 18:31

## 2022-10-24 RX ADMIN — SODIUM CHLORIDE 100 ML: 9 INJECTION, SOLUTION INTRAVENOUS at 16:32

## 2022-10-24 RX ADMIN — PANTOPRAZOLE SODIUM 40 MG: 40 INJECTION, POWDER, LYOPHILIZED, FOR SOLUTION INTRAVENOUS at 18:15

## 2022-10-24 RX ADMIN — SODIUM CHLORIDE 1000 ML: 9 INJECTION, SOLUTION INTRAVENOUS at 15:59

## 2022-10-24 RX ADMIN — SODIUM CHLORIDE, PRESERVATIVE FREE 10 ML: 5 INJECTION INTRAVENOUS at 18:35

## 2022-10-24 ASSESSMENT — ENCOUNTER SYMPTOMS
ANAL BLEEDING: 0
DIARRHEA: 0
COLOR CHANGE: 0
VOMITING: 0
WHEEZING: 0
BACK PAIN: 0
EYE PAIN: 0
SHORTNESS OF BREATH: 0
APNEA: 0
CHEST TIGHTNESS: 0
RHINORRHEA: 0
NAUSEA: 1
SORE THROAT: 0
EYE DISCHARGE: 0
COUGH: 0
EYE REDNESS: 0
ABDOMINAL PAIN: 1
ABDOMINAL DISTENTION: 1
EYE ITCHING: 0

## 2022-10-24 ASSESSMENT — PAIN SCALES - GENERAL
PAINLEVEL_OUTOF10: 0
PAINLEVEL_OUTOF10: 0
PAINLEVEL_OUTOF10: 8
PAINLEVEL_OUTOF10: 6

## 2022-10-24 ASSESSMENT — PAIN - FUNCTIONAL ASSESSMENT
PAIN_FUNCTIONAL_ASSESSMENT: 0-10
PAIN_FUNCTIONAL_ASSESSMENT: NONE - DENIES PAIN

## 2022-10-24 ASSESSMENT — PAIN DESCRIPTION - LOCATION
LOCATION: ABDOMEN
LOCATION: ABDOMEN

## 2022-10-24 NOTE — ED PROVIDER NOTES
Emergency Department Provider Note                   PCP:                Elaine Verde MD               Age: 67 y.o. Sex: female       ICD-10-CM    1. Dyspepsia  R10.13           DISPOSITION Discharge - Pending Orders Complete 10/24/2022 05:52:27 PM        MDM  Number of Diagnoses or Management Options  Dyspepsia: new, needed workup  Diagnosis management comments: Concern for diverticulitis, will obtain CT imaging. Laboratory evaluation is unremarkable. CT imaging shows no evidence of acute diverticulitis. Diverticula are noted in the sigmoid colon. Patient reports ongoing \"burning\" discomfort in the lower epigastrium. She admits to eating a large amount of rich foods over the past several days after [place] a stove. Suspect gastritis, reflux as cause of patient's symptoms given normal work-up thus far. Orders placed for GI cocktail and Protonix. We will plan to treat patient symptomatically outpatient and referred to GI. Amount and/or Complexity of Data Reviewed  Clinical lab tests: ordered and reviewed  Tests in the radiology section of CPT®: ordered and reviewed  Tests in the medicine section of CPT®: ordered and reviewed  Independent visualization of images, tracings, or specimens: yes    Risk of Complications, Morbidity, and/or Mortality  Presenting problems: moderate  Diagnostic procedures: low  Management options: moderate    Patient Progress  Patient progress: stable       ED Course as of 10/24/22 1752   Mon Oct 24, 2022   1618 EKG interpretation: Sinus rhythm, rate of 76, normal axis, no ischemia. [BR]   1732 Discussed CT results with patient who states she still has burning discomfort in the lower epigastrium consistent with previous bouts of reflux. She does admit to eating heavily over the past weekend including rich foods such as seafood and posey. This is not what she typically eats. We will plan to treat with GI cocktail, dose of Protonix and reassess.  [BR]      ED Course User Index  [BR] Heide Posada, DO        Orders Placed This Encounter   Procedures    CT ABDOMEN PELVIS W IV CONTRAST Additional Contrast? None    CBC with Diff    CMP    Lipase    Urinalysis w rflx microscopic    Urinalysis, Micro    Diet NPO    EKG 12 Lead    Saline lock IV        Medications   sodium chloride flush 0.9 % injection 5-40 mL (has no administration in time range)   lidocaine viscous hcl (XYLOCAINE) 2 % solution 15 mL (has no administration in time range)   aluminum & magnesium hydroxide-simethicone (MAALOX) 200-200-20 MG/5ML suspension 30 mL (has no administration in time range)   pantoprazole (PROTONIX) 40 mg in sodium chloride (PF) 10 mL injection (has no administration in time range)   morphine injection 4 mg (4 mg IntraVENous Given 10/24/22 1558)   ondansetron (ZOFRAN) injection 4 mg (4 mg IntraVENous Given 10/24/22 1559)   0.9 % sodium chloride bolus (0 mLs IntraVENous Stopped 10/24/22 1701)   0.9 % sodium chloride bolus (0 mLs IntraVENous Stopped 10/24/22 1659)   iopamidol (ISOVUE-370) 76 % injection 100 mL (100 mLs IntraVENous Given 10/24/22 1632)       New Prescriptions    OMEPRAZOLE (PRILOSEC) 40 MG DELAYED RELEASE CAPSULE    Take 1 capsule by mouth every morning (before breakfast)    ONDANSETRON (ZOFRAN) 4 MG TABLET    Take 1 tablet by mouth 3 times daily as needed for Nausea or Vomiting    SUCRALFATE (CARAFATE) 1 GM TABLET    Take 1 tablet by mouth 4 times daily        Frankey Frater is a 67 y.o. female who presents to the Emergency Department with chief complaint of    Chief Complaint   Patient presents with    Abdominal Pain      18-year-old female patient presenting to this department with reports of worsening left-sided abdominal pain and bloating. She also notes some mild nausea. She denies any vomiting, associated fever or chills. She states pain has been constant nature and is worsened with upright positioning and palpation of the area.   This pain does not radiate. She reports history of diverticulitis in the past but states it did not require surgical correction. She reports a history of an appendectomy as well. Patient has taken nothing for her symptoms. She was able to eat breakfast this morning and kept it down. She reports some burning discomfort in the lower epigastric region as well. The history is provided by the patient and a relative. No  was used. Review of Systems   Constitutional:  Negative for activity change, appetite change, chills, fatigue and fever. HENT:  Negative for congestion, ear discharge, ear pain, rhinorrhea and sore throat. Eyes:  Negative for pain, discharge, redness, itching and visual disturbance. Respiratory:  Negative for apnea, cough, chest tightness, shortness of breath and wheezing. Cardiovascular:  Negative for chest pain, palpitations and leg swelling. Gastrointestinal:  Positive for abdominal distention, abdominal pain and nausea. Negative for anal bleeding, diarrhea and vomiting. Genitourinary:  Negative for difficulty urinating, dysuria, flank pain, frequency, hematuria, pelvic pain, vaginal bleeding and vaginal discharge. Musculoskeletal:  Negative for arthralgias, back pain, myalgias, neck pain and neck stiffness. Skin:  Negative for color change, pallor, rash and wound. Neurological:  Negative for dizziness, tremors, facial asymmetry, weakness, light-headedness, numbness and headaches. Psychiatric/Behavioral:  Negative for agitation, behavioral problems, confusion, self-injury and suicidal ideas. The patient is not nervous/anxious. All other systems reviewed and are negative.     Past Medical History:   Diagnosis Date    Menopause     @51        Past Surgical History:   Procedure Laterality Date    APPENDECTOMY      HIP FRACTURE SURGERY Left         Family History   Problem Relation Age of Onset    Breast Cancer Neg Hx         Social History     Socioeconomic History Marital status:      Spouse name: None    Number of children: None    Years of education: None    Highest education level: None   Tobacco Use    Smoking status: Never    Smokeless tobacco: Never   Substance and Sexual Activity    Alcohol use: No    Drug use: No         Patient has no known allergies. Previous Medications    HYDROCODONE-ACETAMINOPHEN (NORCO) 5-325 MG PER TABLET    Take 1 tablet by mouth every 6 hours as needed. Vitals signs and nursing note reviewed. Patient Vitals for the past 4 hrs:   Temp Pulse Resp BP SpO2   10/24/22 1700 -- 80 14 (!) 156/86 97 %   10/24/22 1517 98.4 °F (36.9 °C) 72 18 (!) 141/90 98 %          Physical Exam  Vitals and nursing note reviewed. Constitutional:       General: She is not in acute distress. Appearance: Normal appearance. She is normal weight. She is not ill-appearing or toxic-appearing. Comments: Generally well-appearing, alert and oriented x4. No acute distress, speaks in clear, fluid sentences. HENT:      Head: Normocephalic and atraumatic. Right Ear: External ear normal.      Left Ear: External ear normal.      Nose: Nose normal.      Mouth/Throat:      Mouth: Mucous membranes are moist.   Eyes:      General: No scleral icterus. Right eye: No discharge. Left eye: No discharge. Extraocular Movements: Extraocular movements intact. Cardiovascular:      Rate and Rhythm: Normal rate and regular rhythm. Pulses: Normal pulses. Heart sounds: Normal heart sounds. Pulmonary:      Effort: Pulmonary effort is normal. No tachypnea, bradypnea, accessory muscle usage, prolonged expiration or respiratory distress. Breath sounds: Normal breath sounds and air entry. No stridor. No decreased breath sounds, wheezing, rhonchi or rales. Abdominal:      General: Abdomen is flat. Bowel sounds are decreased. There is no distension. Palpations: There is no mass. Tenderness:  There is abdominal tenderness in the epigastric area, left upper quadrant and left lower quadrant. There is guarding. There is no right CVA tenderness, left CVA tenderness or rebound. Negative signs include Napoles's sign and McBurney's sign. Hernia: No hernia is present. Comments: Generally tender abdomen. Most pronounced over the left upper and lower quadrants. Some mild guarding is present. Positive Rovsing sign. No pain at McBurney's point. Negative Napoles sign. Musculoskeletal:         General: No swelling, tenderness or deformity. Normal range of motion. Cervical back: Normal range of motion. Skin:     General: Skin is warm. Capillary Refill: Capillary refill takes less than 2 seconds. Neurological:      General: No focal deficit present. Mental Status: She is alert. Psychiatric:         Mood and Affect: Mood normal.        Procedures    Results for orders placed or performed during the hospital encounter of 10/24/22   CT ABDOMEN PELVIS W IV CONTRAST Additional Contrast? None    Narrative    EXAMINATION: CT ABDOMEN PELVIS W IV CONTRAST 10/24/2022 4:35 PM    ACCESSION NUMBER: [id number]    COMPARISON: CT abdomen/pelvis 10/21/2018. INDICATION: LLQ abdominal pain, hx of diverticulitis    TECHNIQUE: Contiguous axial computed tomographic images were obtained from the  domes of the diaphragm to the symphysis pubis following administration 100mL  Iso-jake 370. Coronal reconstructions were also performed. Radiation dose reduction techniques were used for this study. Our CT scanners  use one or all of the following: Automated exposure control, adjustment of the  mA and/or kV according to patient size, iterative reconstruction. FINDINGS:  LUNG BASES: No focal consolidation in the visualized lower lungs. Left lower  lobe calcified granuloma. LIVER: Stable multifocal small hepatic hypodensities. A few small calcified  hepatic granulomas. No new or suspicious liver lesion.     BILIARY TREE: Small polyp versus stone along the gallbladder body/neck. No  gallbladder wall thickening or pericholecystic fluid No biliary dilation. SPLEEN: Calcified splenic granulomas. Spleen is otherwise unremarkable. PANCREAS: No pancreatic mass or ductal dilation. ADRENALS: Normal.    KIDNEYS/BLADDER: The kidneys are symmetric in size. No renal calculus or  hydronephrosis. Subcentimeter renal hypodensities, too small to characterize. The urinary bladder is unremarkable. BOWEL: Sigmoid diverticulosis without definitive evidence of diverticulitis. Small hiatal hernia. The small bowel is normal in caliber. No bowel wall  thickening. APPENDIX: The appendix is normal.    PERITONEUM/RETROPERITONEUM: No ascites or free air. No pelvic or retroperitoneal  lymphadenopathy. VESSELS: The abdominal aorta and branch vessels are normal in caliber and  patent. Atherosclerotic calcification. Patent portal venous system. ABDOMINAL WALL: Unremarkable. REPRODUCTIVE: Unremarkable. BONES: Left proximal femur fixation hardware. No acute osseous abnormality. No  suspicious osseous lesion. Impression    1. No acute pathology within the abdomen or pelvis. 2. Sigmoid diverticulosis without definitive CT evidence of diverticulitis. 3. Small polyp versus stone along the gallbladder body/neck. No secondary  findings to suggest acute cholecystitis.        CBC with Diff   Result Value Ref Range    WBC 8.2 4.3 - 11.1 K/uL    RBC 4.38 4.05 - 5.20 M/uL    Hemoglobin 13.6 11.7 - 15.4 g/dL    Hematocrit 40.9 35.8 - 46.3 %    MCV 93.4 82.0 - 102.0 FL    MCH 31.1 26.1 - 32.9 PG    MCHC 33.3 31.4 - 35.0 g/dL    RDW 12.7 11.9 - 14.6 %    Platelets 250 791 - 302 K/uL    MPV 8.9 (L) 9.4 - 12.3 FL    nRBC 0.00 0.0 - 0.2 K/uL    Differential Type AUTOMATED      Seg Neutrophils 74 43 - 78 %    Lymphocytes 17 13 - 44 %    Monocytes 7 4.0 - 12.0 %    Eosinophils % 1 0.5 - 7.8 %    Basophils 1 0.0 - 2.0 %    Immature Granulocytes 0 0.0 - 5.0 %    Segs Absolute 6.1 1.7 - 8.2 K/UL    Absolute Lymph # 1.4 0.5 - 4.6 K/UL    Absolute Mono # 0.6 0.1 - 1.3 K/UL    Absolute Eos # 0.1 0.0 - 0.8 K/UL    Basophils Absolute 0.0 0.0 - 0.2 K/UL    Absolute Immature Granulocyte 0.0 0.0 - 0.5 K/UL   CMP   Result Value Ref Range    Sodium 138 133 - 143 mmol/L    Potassium 4.4 3.5 - 5.1 mmol/L    Chloride 102 98 - 107 mmol/L    CO2 24 21 - 32 mmol/L    Anion Gap 12 (H) 2 - 11 mmol/L    Glucose 118 (H) 65 - 100 mg/dL    BUN 17 7.0 - 18.0 MG/DL    Creatinine 0.59 (L) 0.6 - 1.0 MG/DL    Est, Glom Filt Rate >60 >60 ml/min/1.73m2    Calcium 9.0 8.3 - 10.4 MG/DL    Total Bilirubin 0.3 0.2 - 1.1 MG/DL    ALT 14 13.0 - 61.0 U/L    AST 20 15 - 37 U/L    Alk Phosphatase 85 45.0 - 117.0 U/L    Total Protein 7.0 6.4 - 8.2 g/dL    Albumin 4.4 3.2 - 4.6 g/dL    Globulin 2.6 (L) 2.8 - 4.5 g/dL    Albumin/Globulin Ratio 1.7 (H) 0.4 - 1.6     Lipase   Result Value Ref Range    Lipase 32 13 - 60 U/L   Urinalysis w rflx microscopic   Result Value Ref Range    Color, UA YELLOW      Appearance CLEAR      Specific Gravity, UA >=1.030 1.001 - 1.023    pH, Urine 6.5 5.0 - 9.0      Protein, UA Negative NEG mg/dL    Glucose, UA Negative mg/dL    Ketones, Urine Negative NEG mg/dL    Bilirubin Urine Negative NEG      Blood, Urine Trace Intact (A) NEG      Urobilinogen, Urine 0.2 0.2 - 1.0 EU/dL    Nitrite, Urine Negative NEG      Leukocyte Esterase, Urine Negative NEG     Urinalysis, Micro   Result Value Ref Range    WBC, UA 0 0 /hpf    RBC, UA 3-5 0 /hpf    Epithelial Cells UA 0 0 /hpf    BACTERIA, URINE 0 0 /hpf    Casts 0 0 /lpf    Crystals 0 0 /LPF    Mucus, UA 0 0 /lpf    OTHER OBSERVATIONS MICROSCOPIC PERFORMED ON UNSPUN URINE SAMPLE. CT ABDOMEN PELVIS W IV CONTRAST Additional Contrast? None   Final Result      1. No acute pathology within the abdomen or pelvis. 2. Sigmoid diverticulosis without definitive CT evidence of diverticulitis.    3. Small polyp versus stone along the gallbladder body/neck. No secondary   findings to suggest acute cholecystitis. Voice dictation software was used during the making of this note. This software is not perfect and grammatical and other typographical errors may be present. This note has not been completely proofread for errors.      Tato Guillen,   10/24/22 Og Hardy,   10/24/22 [id number]

## 2022-10-24 NOTE — ED NOTES
I have reviewed discharge instructions with the patient. The patient verbalized understanding. Patient left ED via Discharge Method: ambulatory to Home with self     Opportunity for questions and clarification provided. Patient given 3 scripts. Patient ambulatory upon discharge in no acute distress. Patient has prescriptions along with her discharge orders and belongings when she left. To continue your aftercare when you leave the hospital, you may receive an automated call from our care team to check in on how you are doing. This is a free service and part of our promise to provide the best care and service to meet your aftercare needs.  If you have questions, or wish to unsubscribe from this service please call [phone]. Thank you for Choosing our [facility] Emergency Department.        Rodrick Ward RN  10/24/22 9157

## 2022-10-24 NOTE — ED TRIAGE NOTES
Patient ambulatory to triage with c/o LLQ pain and bloating that started yesterday. Denies chest pain, N/V/D. Pt does have hx of diverticulitis.

## 2022-10-24 NOTE — DISCHARGE INSTRUCTIONS
Take the medication prescribed as directed. Avoid fatty, greasy, spicy foods, alcohol and excessive caffeine as each of these things may worsen your symptoms. You should also avoid anti-inflammatory medications such as Motrin, ibuprofen, etc.  Arrange follow-up with your primary care provider and the GI specialist listed. Return for worsening symptoms, concerns or questions.

## 2023-11-08 ENCOUNTER — HOSPITAL ENCOUNTER (INPATIENT)
Age: 73
LOS: 3 days | Discharge: NURSING, REHAB OR LONG-TERM CARE | End: 2023-11-11
Attending: EMERGENCY MEDICINE | Admitting: FAMILY MEDICINE
Payer: MEDICARE

## 2023-11-08 ENCOUNTER — APPOINTMENT (OUTPATIENT)
Dept: CT IMAGING | Age: 73
End: 2023-11-08
Payer: MEDICARE

## 2023-11-08 ENCOUNTER — APPOINTMENT (OUTPATIENT)
Dept: GENERAL RADIOLOGY | Age: 73
End: 2023-11-08
Payer: MEDICARE

## 2023-11-08 DIAGNOSIS — S42.214A CLOSED NONDISPLACED FRACTURE OF SURGICAL NECK OF RIGHT HUMERUS, UNSPECIFIED FRACTURE MORPHOLOGY, INITIAL ENCOUNTER: ICD-10-CM

## 2023-11-08 DIAGNOSIS — S92.314A CLOSED NONDISPLACED FRACTURE OF FIRST METATARSAL BONE OF RIGHT FOOT, INITIAL ENCOUNTER: ICD-10-CM
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
## 2023-11-08 DIAGNOSIS — S92.324A CLOSED NONDISPLACED FRACTURE OF SECOND METATARSAL BONE OF RIGHT FOOT, INITIAL ENCOUNTER: ICD-10-CM

## 2023-11-08 DIAGNOSIS — S92.344A CLOSED NONDISPLACED FRACTURE OF FOURTH METATARSAL BONE OF RIGHT FOOT, INITIAL ENCOUNTER: ICD-10-CM

## 2023-11-08 DIAGNOSIS — R26.2 UNABLE TO AMBULATE: ICD-10-CM

## 2023-11-08 DIAGNOSIS — S92.334A CLOSED NONDISPLACED FRACTURE OF THIRD METATARSAL BONE OF RIGHT FOOT, INITIAL ENCOUNTER: ICD-10-CM
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
## 2023-11-08 PROBLEM — S92.301A CLOSED NONDISPLACED FRACTURE OF METATARSAL BONE OF RIGHT FOOT: Status: ACTIVE | Noted: 2023-11-08

## 2023-11-08 PROBLEM — K57.20 DIVERTICULITIS OF COLON WITH PERFORATION: Status: RESOLVED | Noted: 2018-10-21 | Resolved: 2023-11-08

## 2023-11-08 PROBLEM — M81.0 OSTEOPOROSIS: Chronic | Status: ACTIVE | Noted: 2023-11-08

## 2023-11-08 PROBLEM — M81.0 OSTEOPOROSIS: Status: ACTIVE | Noted: 2023-11-08

## 2023-11-08 LAB
ALBUMIN SERPL-MCNC: 3.8 G/DL (ref 3.2–4.6)
ALBUMIN/GLOB SERPL: 1.1 (ref 0.4–1.6)
ALP SERPL-CCNC: 80 U/L (ref 50–136)
ALT SERPL-CCNC: 32 U/L (ref 12–65)
ANION GAP SERPL CALC-SCNC: 7 MMOL/L (ref 2–11)
AST SERPL-CCNC: 30 U/L (ref 15–37)
BASOPHILS # BLD: 0 K/UL (ref 0–0.2)
BASOPHILS NFR BLD: 1 % (ref 0–2)
BILIRUB SERPL-MCNC: 0.8 MG/DL (ref 0.2–1.1)
BUN SERPL-MCNC: 14 MG/DL (ref 8–23)
CALCIUM SERPL-MCNC: 9.1 MG/DL (ref 8.3–10.4)
CHLORIDE SERPL-SCNC: 101 MMOL/L (ref 101–110)
CO2 SERPL-SCNC: 26 MMOL/L (ref 21–32)
CREAT SERPL-MCNC: 0.76 MG/DL (ref 0.6–1)
DIFFERENTIAL METHOD BLD: ABNORMAL
EOSINOPHIL # BLD: 0.1 K/UL (ref 0–0.8)
EOSINOPHIL NFR BLD: 1 % (ref 0.5–7.8)
ERYTHROCYTE [DISTWIDTH] IN BLOOD BY AUTOMATED COUNT: 12.8 % (ref 11.9–14.6)
GLOBULIN SER CALC-MCNC: 3.6 G/DL (ref 2.8–4.5)
GLUCOSE SERPL-MCNC: 110 MG/DL (ref 65–100)
HCT VFR BLD AUTO: 41.9 % (ref 35.8–46.3)
HGB BLD-MCNC: 13.6 G/DL (ref 11.7–15.4)
IMM GRANULOCYTES # BLD AUTO: 0 K/UL (ref 0–0.5)
IMM GRANULOCYTES NFR BLD AUTO: 0 % (ref 0–5)
LYMPHOCYTES # BLD: 1.8 K/UL (ref 0.5–4.6)
LYMPHOCYTES NFR BLD: 26 % (ref 13–44)
MCH RBC QN AUTO: 30.3 PG (ref 26.1–32.9)
MCHC RBC AUTO-ENTMCNC: 32.5 G/DL (ref 31.4–35)
MCV RBC AUTO: 93.3 FL (ref 82–102)
MONOCYTES # BLD: 0.7 K/UL (ref 0.1–1.3)
MONOCYTES NFR BLD: 10 % (ref 4–12)
NEUTS SEG # BLD: 4.3 K/UL (ref 1.7–8.2)
NEUTS SEG NFR BLD: 61 % (ref 43–78)
NRBC # BLD: 0 K/UL (ref 0–0.2)
PLATELET # BLD AUTO: 232 K/UL (ref 150–450)
PMV BLD AUTO: 9 FL (ref 9.4–12.3)
POTASSIUM SERPL-SCNC: 3.9 MMOL/L (ref 3.5–5.1)
PROT SERPL-MCNC: 7.4 G/DL (ref 6.3–8.2)
RBC # BLD AUTO: 4.49 M/UL (ref 4.05–5.2)
SODIUM SERPL-SCNC: 134 MMOL/L (ref 133–143)
WBC # BLD AUTO: 7 K/UL (ref 4.3–11.1)

## 2023-11-08 PROCEDURE — 1100000000 HC RM PRIVATE

## 2023-11-08 PROCEDURE — 81001 URINALYSIS AUTO W/SCOPE: CPT

## 2023-11-08 PROCEDURE — 85025 COMPLETE CBC W/AUTO DIFF WBC: CPT

## 2023-11-08 PROCEDURE — 99285 EMERGENCY DEPT VISIT HI MDM: CPT

## 2023-11-08 PROCEDURE — 73030 X-RAY EXAM OF SHOULDER: CPT

## 2023-11-08 PROCEDURE — 73700 CT LOWER EXTREMITY W/O DYE: CPT

## 2023-11-08 PROCEDURE — 6370000000 HC RX 637 (ALT 250 FOR IP)

## 2023-11-08 PROCEDURE — 80053 COMPREHEN METABOLIC PANEL: CPT

## 2023-11-08 PROCEDURE — 72125 CT NECK SPINE W/O DYE: CPT

## 2023-11-08 PROCEDURE — 70450 CT HEAD/BRAIN W/O DYE: CPT

## 2023-11-08 RX ORDER — ONDANSETRON 4 MG/1
4 TABLET, ORALLY DISINTEGRATING ORAL EVERY 8 HOURS PRN
Status: DISCONTINUED | OUTPATIENT
Start: 2023-11-08 | End: 2023-11-11 | Stop reason: HOSPADM

## 2023-11-08 RX ORDER — HYDROCODONE BITARTRATE AND ACETAMINOPHEN 5; 325 MG/1; MG/1
1 TABLET ORAL
Status: COMPLETED | OUTPATIENT
Start: 2023-11-08 | End: 2023-11-08

## 2023-11-08 RX ORDER — MAGNESIUM SULFATE IN WATER 40 MG/ML
2000 INJECTION, SOLUTION INTRAVENOUS PRN
Status: DISCONTINUED | OUTPATIENT
Start: 2023-11-08 | End: 2023-11-11 | Stop reason: HOSPADM

## 2023-11-08 RX ORDER — POTASSIUM CHLORIDE 7.45 MG/ML
10 INJECTION INTRAVENOUS PRN
Status: DISCONTINUED | OUTPATIENT
Start: 2023-11-08 | End: 2023-11-11 | Stop reason: HOSPADM

## 2023-11-08 RX ORDER — SODIUM CHLORIDE 0.9 % (FLUSH) 0.9 %
5-40 SYRINGE (ML) INJECTION PRN
Status: DISCONTINUED | OUTPATIENT
Start: 2023-11-08 | End: 2023-11-11 | Stop reason: HOSPADM

## 2023-11-08 RX ORDER — ONDANSETRON 2 MG/ML
4 INJECTION INTRAMUSCULAR; INTRAVENOUS EVERY 6 HOURS PRN
Status: DISCONTINUED | OUTPATIENT
Start: 2023-11-08 | End: 2023-11-11 | Stop reason: HOSPADM

## 2023-11-08 RX ORDER — SODIUM CHLORIDE 9 MG/ML
INJECTION, SOLUTION INTRAVENOUS PRN
Status: DISCONTINUED | OUTPATIENT
Start: 2023-11-08 | End: 2023-11-11 | Stop reason: HOSPADM

## 2023-11-08 RX ORDER — SODIUM CHLORIDE 0.9 % (FLUSH) 0.9 %
5-40 SYRINGE (ML) INJECTION EVERY 12 HOURS SCHEDULED
Status: DISCONTINUED | OUTPATIENT
Start: 2023-11-09 | End: 2023-11-11 | Stop reason: HOSPADM

## 2023-11-08 RX ORDER — ENOXAPARIN SODIUM 100 MG/ML
40 INJECTION SUBCUTANEOUS DAILY
Status: DISCONTINUED | OUTPATIENT
Start: 2023-11-09 | End: 2023-11-11 | Stop reason: HOSPADM

## 2023-11-08 RX ORDER — POTASSIUM CHLORIDE 20 MEQ/1
40 TABLET, EXTENDED RELEASE ORAL PRN
Status: DISCONTINUED | OUTPATIENT
Start: 2023-11-08 | End: 2023-11-11 | Stop reason: HOSPADM

## 2023-11-08 RX ORDER — POLYETHYLENE GLYCOL 3350 17 G/17G
17 POWDER, FOR SOLUTION ORAL DAILY PRN
Status: DISCONTINUED | OUTPATIENT
Start: 2023-11-08 | End: 2023-11-11 | Stop reason: HOSPADM

## 2023-11-08 RX ORDER — ACETAMINOPHEN 650 MG/1
650 SUPPOSITORY RECTAL EVERY 6 HOURS PRN
Status: DISCONTINUED | OUTPATIENT
Start: 2023-11-08 | End: 2023-11-11 | Stop reason: HOSPADM

## 2023-11-08 RX ORDER — ACETAMINOPHEN 325 MG/1
650 TABLET ORAL EVERY 6 HOURS PRN
Status: DISCONTINUED | OUTPATIENT
Start: 2023-11-08 | End: 2023-11-11 | Stop reason: HOSPADM

## 2023-11-08 RX ADMIN — HYDROCODONE BITARTRATE AND ACETAMINOPHEN 1 TABLET: 5; 325 TABLET ORAL at 20:38

## 2023-11-08 ASSESSMENT — PAIN DESCRIPTION - ORIENTATION: ORIENTATION: RIGHT

## 2023-11-08 ASSESSMENT — PAIN SCALES - GENERAL
PAINLEVEL_OUTOF10: 10
PAINLEVEL_OUTOF10: 10

## 2023-11-08 ASSESSMENT — PAIN - FUNCTIONAL ASSESSMENT: PAIN_FUNCTIONAL_ASSESSMENT: 0-10

## 2023-11-08 ASSESSMENT — LIFESTYLE VARIABLES
HOW OFTEN DO YOU HAVE A DRINK CONTAINING ALCOHOL: NEVER
HOW MANY STANDARD DRINKS CONTAINING ALCOHOL DO YOU HAVE ON A TYPICAL DAY: PATIENT DOES NOT DRINK

## 2023-11-08 ASSESSMENT — PAIN DESCRIPTION - PAIN TYPE: TYPE: ACUTE PAIN

## 2023-11-08 NOTE — ED TRIAGE NOTES
Pt presents from home after falling yesterday around 12. Pt went to the ED in [place] where she was diagnosed with fx right foot. Patient reports uncontrolled pain to Right Shoulder and inability to ambulate to bathroom.

## 2023-11-09 ENCOUNTER — APPOINTMENT (OUTPATIENT)
Dept: MRI IMAGING | Age: 73
End: 2023-11-09
Payer: MEDICARE

## 2023-11-09 LAB
ALBUMIN SERPL-MCNC: 3.2 G/DL (ref 3.2–4.6)
ANION GAP SERPL CALC-SCNC: 8 MMOL/L (ref 2–11)
APPEARANCE UR: CLEAR
BACTERIA URNS QL MICRO: NEGATIVE /HPF
BILIRUB UR QL: NEGATIVE
BUN SERPL-MCNC: 13 MG/DL (ref 8–23)
CALCIUM SERPL-MCNC: 8.5 MG/DL (ref 8.3–10.4)
CASTS URNS QL MICRO: ABNORMAL /LPF
CHLORIDE SERPL-SCNC: 103 MMOL/L (ref 101–110)
CHOLEST SERPL-MCNC: 161 MG/DL
CO2 SERPL-SCNC: 25 MMOL/L (ref 21–32)
COLOR UR: ABNORMAL
CREAT SERPL-MCNC: 0.64 MG/DL (ref 0.6–1)
EPI CELLS #/AREA URNS HPF: ABNORMAL /HPF
ERYTHROCYTE [DISTWIDTH] IN BLOOD BY AUTOMATED COUNT: 12.8 % (ref 11.9–14.6)
EST. AVERAGE GLUCOSE BLD GHB EST-MCNC: 111 MG/DL
GLUCOSE SERPL-MCNC: 97 MG/DL (ref 65–100)
GLUCOSE UR STRIP.AUTO-MCNC: NEGATIVE MG/DL
HBA1C MFR BLD: 5.5 % (ref 4.8–5.6)
HCT VFR BLD AUTO: 37.8 % (ref 35.8–46.3)
HDLC SERPL-MCNC: 49 MG/DL (ref 40–60)
HDLC SERPL: 3.3
HGB BLD-MCNC: 12.3 G/DL (ref 11.7–15.4)
HGB UR QL STRIP: NEGATIVE
KETONES UR QL STRIP.AUTO: 40 MG/DL
LDLC SERPL CALC-MCNC: 93 MG/DL
LEUKOCYTE ESTERASE UR QL STRIP.AUTO: ABNORMAL
MCH RBC QN AUTO: 30.4 PG (ref 26.1–32.9)
MCHC RBC AUTO-ENTMCNC: 32.5 G/DL (ref 31.4–35)
MCV RBC AUTO: 93.3 FL (ref 82–102)
MUCOUS THREADS URNS QL MICRO: 0 /LPF
NITRITE UR QL STRIP.AUTO: NEGATIVE
NRBC # BLD: 0 K/UL (ref 0–0.2)
PH UR STRIP: 6.5 (ref 5–9)
PHOSPHATE SERPL-MCNC: 3.6 MG/DL (ref 2.3–3.7)
PLATELET # BLD AUTO: 199 K/UL (ref 150–450)
PMV BLD AUTO: 9.1 FL (ref 9.4–12.3)
POTASSIUM SERPL-SCNC: 3.6 MMOL/L (ref 3.5–5.1)
PROCALCITONIN SERPL-MCNC: <0.05 NG/ML (ref 0–0.49)
PROT UR STRIP-MCNC: NEGATIVE MG/DL
RBC # BLD AUTO: 4.05 M/UL (ref 4.05–5.2)
RBC #/AREA URNS HPF: ABNORMAL /HPF
SODIUM SERPL-SCNC: 136 MMOL/L (ref 133–143)
SP GR UR REFRACTOMETRY: 1.02 (ref 1–1.02)
TRIGL SERPL-MCNC: 95 MG/DL (ref 35–150)
TSH W FREE THYROID IF ABNORMAL: 1.79 UIU/ML (ref 0.36–3.74)
URINE CULTURE IF INDICATED: ABNORMAL
UROBILINOGEN UR QL STRIP.AUTO: 1 EU/DL (ref 0.2–1)
VLDLC SERPL CALC-MCNC: 19 MG/DL (ref 6–23)
WBC # BLD AUTO: 5.5 K/UL (ref 4.3–11.1)
WBC URNS QL MICRO: ABNORMAL /HPF

## 2023-11-09 PROCEDURE — 85027 COMPLETE CBC AUTOMATED: CPT

## 2023-11-09 PROCEDURE — 73718 MRI LOWER EXTREMITY W/O DYE: CPT

## 2023-11-09 PROCEDURE — 84100 ASSAY OF PHOSPHORUS: CPT

## 2023-11-09 PROCEDURE — 97530 THERAPEUTIC ACTIVITIES: CPT

## 2023-11-09 PROCEDURE — 84145 PROCALCITONIN (PCT): CPT

## 2023-11-09 PROCEDURE — 1100000000 HC RM PRIVATE

## 2023-11-09 PROCEDURE — 82040 ASSAY OF SERUM ALBUMIN: CPT

## 2023-11-09 PROCEDURE — 80048 BASIC METABOLIC PNL TOTAL CA: CPT

## 2023-11-09 PROCEDURE — 80061 LIPID PANEL: CPT

## 2023-11-09 PROCEDURE — 6370000000 HC RX 637 (ALT 250 FOR IP): Performed by: NURSE PRACTITIONER

## 2023-11-09 PROCEDURE — 2500000003 HC RX 250 WO HCPCS: Performed by: FAMILY MEDICINE

## 2023-11-09 PROCEDURE — 97116 GAIT TRAINING THERAPY: CPT

## 2023-11-09 PROCEDURE — 97165 OT EVAL LOW COMPLEX 30 MIN: CPT

## 2023-11-09 PROCEDURE — 84443 ASSAY THYROID STIM HORMONE: CPT

## 2023-11-09 PROCEDURE — 83036 HEMOGLOBIN GLYCOSYLATED A1C: CPT

## 2023-11-09 PROCEDURE — 6360000002 HC RX W HCPCS: Performed by: FAMILY MEDICINE

## 2023-11-09 PROCEDURE — 28470 CLTX METATARSAL FX WO MNP EA: CPT | Performed by: ORTHOPAEDIC SURGERY

## 2023-11-09 PROCEDURE — 2580000003 HC RX 258: Performed by: FAMILY MEDICINE

## 2023-11-09 PROCEDURE — 97161 PT EVAL LOW COMPLEX 20 MIN: CPT

## 2023-11-09 PROCEDURE — 6360000002 HC RX W HCPCS: Performed by: NURSE PRACTITIONER

## 2023-11-09 PROCEDURE — 36415 COLL VENOUS BLD VENIPUNCTURE: CPT

## 2023-11-09 PROCEDURE — 6370000000 HC RX 637 (ALT 250 FOR IP): Performed by: FAMILY MEDICINE

## 2023-11-09 RX ORDER — MORPHINE SULFATE 2 MG/ML
1 INJECTION, SOLUTION INTRAMUSCULAR; INTRAVENOUS EVERY 4 HOURS PRN
Status: ACTIVE | OUTPATIENT
Start: 2023-11-09 | End: 2023-11-11

## 2023-11-09 RX ORDER — KETOROLAC TROMETHAMINE 30 MG/ML
15 INJECTION, SOLUTION INTRAMUSCULAR; INTRAVENOUS EVERY 6 HOURS PRN
Status: DISCONTINUED | OUTPATIENT
Start: 2023-11-09 | End: 2023-11-11 | Stop reason: HOSPADM

## 2023-11-09 RX ORDER — PANTOPRAZOLE SODIUM 40 MG/1
40 TABLET, DELAYED RELEASE ORAL
Status: DISCONTINUED | OUTPATIENT
Start: 2023-11-09 | End: 2023-11-11 | Stop reason: HOSPADM

## 2023-11-09 RX ORDER — HYDROCODONE BITARTRATE AND ACETAMINOPHEN 5; 325 MG/1; MG/1
1 TABLET ORAL EVERY 6 HOURS PRN
Status: DISCONTINUED | OUTPATIENT
Start: 2023-11-09 | End: 2023-11-11 | Stop reason: HOSPADM

## 2023-11-09 RX ADMIN — ACETAMINOPHEN 650 MG: 325 TABLET, FILM COATED ORAL at 04:52

## 2023-11-09 RX ADMIN — ENOXAPARIN SODIUM 40 MG: 100 INJECTION SUBCUTANEOUS at 08:56

## 2023-11-09 RX ADMIN — SODIUM CHLORIDE, PRESERVATIVE FREE 10 ML: 5 INJECTION INTRAVENOUS at 20:15

## 2023-11-09 RX ADMIN — TUBERCULIN PURIFIED PROTEIN DERIVATIVE 5 UNITS: 5 INJECTION, SOLUTION INTRADERMAL at 08:58

## 2023-11-09 RX ADMIN — KETOROLAC TROMETHAMINE 15 MG: 30 INJECTION, SOLUTION INTRAMUSCULAR; INTRAVENOUS at 12:38

## 2023-11-09 RX ADMIN — SODIUM CHLORIDE, PRESERVATIVE FREE 10 ML: 5 INJECTION INTRAVENOUS at 08:59

## 2023-11-09 ASSESSMENT — PAIN DESCRIPTION - LOCATION: LOCATION: SHOULDER;ARM

## 2023-11-09 ASSESSMENT — PAIN DESCRIPTION - DESCRIPTORS: DESCRIPTORS: ACHING

## 2023-11-09 ASSESSMENT — PAIN SCALES - GENERAL: PAINLEVEL_OUTOF10: 6

## 2023-11-09 NOTE — PROGRESS NOTES
ACUTE OCCUPATIONAL THERAPY GOALS:   (Developed with and agreed upon by patient and/or caregiver.)  1. Patient will perform lower body dressing with min assist.  2. Patient will perform upper and lower body bathing with min assist.  3. Patient will perform toilet transfers with stand by assist.  4. Patient will participate in 30 + minutes of ADL/ therapeutic exercise/therapeutic activity with min rest breaks to increase activity tolerance for self care. 5. Patient will perform standing grooming tasks x3 mins with stand by assist.  6. Patient will perform ADL functional mobility in room with stand by assist.    Goals to be achieved in 7 days. OCCUPATIONAL THERAPY Initial Assessment and Daily Note       OT Visit Days: 1  Acknowledge Orders  Time  OT Charge Capture  Rehab Caseload Tracker      Deidre Petersen is a 75 y.o. female   PRIMARY DIAGNOSIS: Fall at home, initial encounter  Fall at home, initial encounter [W19. XXXA, J37.325]       Reason for Referral: Generalized Muscle Weakness (M62.81)  Other lack of cordination (R27.8)  Difficulty in walking, Not elsewhere classified (R26.2)  History of falling (Z91.81)  Inpatient: Payor: MEDICARE / Plan: MEDICARE PART A AND B / Product Type: *No Product type* /     ASSESSMENT:     REHAB RECOMMENDATIONS:   Recommendation to date pending progress:  Setting:  Short-term Rehab    Equipment:    To Be Determined     ASSESSMENT:  Ms. Jennie La is a 75year old admitted due to inability to care for self after having a fall. Imaging shows multiple fractures R foot per ortho is heel WB with boot. Patient does reports R shoulder pain as well although imaging shows no fracture. Patient lives with her son who is gone most of the day. She is typically independent with ADL tasks and does not use any assistive devices. Patient had this fall in [place] while she was getting her condo ready to sell, her main home is here.  Patient anxious about attempting to stand/take steps Generalized weakness yet functional   R shoulder not assessed due to pain       Posture / Balance []  Fair standing balance with rolling walker    Sensation [x]     Coordination []  Decreased yet functional      Tone []       Edema []    Activity Tolerance []  Limited by pain and fatigue      Hand Dominance R [] L []      COGNITION/  PERCEPTION: INTACT IMPAIRED   (See Comments)   Orientation [x]     Vision [x]     Hearing [x]     Cognition  [x]     Perception [x]       MOBILITY: I Mod I S SBA CGA Min Mod Max Total  NT x2 Comments:   Bed Mobility    Rolling [] [] [] [] [] [] [] [] [] [] []    Supine to Sit [] [] [] [] [] [x] [] [] [] [] []    Scooting [] [] [] [] [] [x] [] [] [] [] []    Sit to Supine [] [] [] [] [] [x] [] [] [] [] []    Transfers    Sit to Stand [] [] [] [] [] [x] [] [] [] [] []    Bed to Chair [] [] [] [] [] [] [] [] [] [] []    Stand to Sit [] [] [] [] [] [x] [] [] [] [] []    Tub/Shower [] [] [] [] [] [] [] [] [] [] []     Toilet [] [] [] [] [] [] [] [] [] [] []      [] [] [] [] [] [] [] [] [] [] []    I=Independent, Mod I=Modified Independent, S=Supervision/Setup, SBA=Standby Assistance, CGA=Contact Guard Assistance, Min=Minimal Assistance, Mod=Moderate Assistance, Max=Maximal Assistance, Total=Total Assistance, NT=Not Tested    ACTIVITIES OF DAILY LIVING: I Mod I S SBA CGA Min Mod Max Total NT Comments   BASIC ADLs:              Upper Body Bathing  [] [] [] [] [] [x] [] [] [] []     Lower Body Bathing [] [] [] [] [] [] [x] [] [] []     Toileting [] [] [] [] [] [] [x] [] [] []    Upper Body Dressing [] [] [] [] [] [x] [] [] [] []    Lower Body Dressing [] [] [] [] [] [] [] [x] [] []    Feeding [] [] [x] [] [] [] [] [] [] []    Grooming [] [] [] [] [] [x] [] [] [] []    Personal Device Care [] [] [] [] [] [] [] [] [] []    Functional Mobility [] [] [] [] [] [x] [] [] [] [] Rolling walker    I=Independent, Mod I=Modified Independent, S=Supervision/Setup, SBA=Standby Assistance, CGA=Contact Guard

## 2023-11-09 NOTE — ED NOTES
Pt up to bathroom with 1 person assist. Tolerated without complaints.      Anthony Rodriguez RN  11/08/23 3709

## 2023-11-09 NOTE — PROGRESS NOTES
Physical and Occupational Therapy Note:    Attempted to see patient this AM for physical and occupational therapy evaluation session. Ortho has ordered short walker boot. PT/OT will assess patient after boot arrives. Discussed with RN. Will follow and re-attempt as schedule permits/patient available.  Thank you,    Isac Ribeiro, PT     Rehab Caseload Tracker

## 2023-11-09 NOTE — PROGRESS NOTES
ACUTE PHYSICAL THERAPY GOALS:   (Developed with and agreed upon by patient and/or caregiver.)  STG:  (1.)Ms. Sandra Hand will move from supine to sit and sit to supine  with STAND BY ASSIST with head of bed elevated and bed rail within 4-7 treatment day(s). (2.)Ms. Sandra Hand will transfer from bed to chair and chair to bed with CONTACT GUARD ASSIST using the least restrictive device within 4-7 treatment day(s). (3.)Ms. Sandra Hand will ambulate with CONTACT GUARD ASSIST for 75 feet with the least restrictive device within 4-7 treatment day(s). ________________________________________________________________________________________________     PHYSICAL THERAPY Initial Assessment and PM  (Link to Caseload Tracking: PT Visit Days : 1  Acknowledge Orders  Time In/Out  PT Charge Capture  Rehab Caseload Tracker    Jann Macias is a 75 y.o. female   PRIMARY DIAGNOSIS: Fall at home, initial encounter  Fall at home, initial encounter [W19. XXXA, P31.377]       Reason for Referral: Generalized Muscle Weakness (M62.81)  Difficulty in walking, Not elsewhere classified (R26.2)  Inpatient: Payor: Suman Castanon / Plan: MEDICARE PART A AND B / Product Type: *No Product type* /     ASSESSMENT:     REHAB RECOMMENDATIONS:   Recommendation to date pending progress:  Setting:  Short-term Rehab    Equipment:    To Be Determined     ASSESSMENT:  Ms. Sandra Hand in [place] yesterday where she sustained a fall and diagnosed with right foot fracture. She is also having increased right shoulder pain but x-ray was negative. She was sent home from [place] with crutches which she has been unable to use due to her shoulder pain. She has been unable to ambulate or care for herself and came to our ED. Ortho ordered her a boot and ordered WBAT through heel only with boot.  She presents with generalized weakness, decreased balance, decreased independence with functional mobility, fall risk, and needing practice with heel weight Care;Precautions  Education Outcome: Verbalized understanding;Demonstrated understanding    TIME IN/OUT:  Time In: 1545  Time Out: [place]  Minutes: [facility], PT

## 2023-11-09 NOTE — PROGRESS NOTES
Hospitalist Progress Note   Admit Date:  2023  6:28 PM   Name:  Magdalena Ronquillo   Age:  75 y.o. Sex:  female  :  1950   MRN:  [id number]   Room:  [facility]/    Presenting Complaint: Fall, broken foot, Rib Injury, and Shoulder Pain     Reason(s) for Admission: Fall at home, initial encounter [W19. Alin Walls, U53.845]     Hospital Course:   Magdalena Ronquillo is a 75 y.o. female with medical history of osteoporosis who presented with fall. She had mechanical fall down some stairs. She was seen in the emergency department [facility] on . Given pain medication and crutche, sent home and told to see ortho. She is unable to use crutches due to also having shoulder pain 2/2 fall. On CT she was noted to have fractures of 4 metatarsal bones right foot. She is admitted for further evaluation management. May need rehab. Subjective & 24hr Events (23): Pt resting in bed. Discussed Dr Malia Evans revoew of films and recommendations. Pt to have boot placed today and work w PT/OT to see if she can go home w services vs need for rehab. Awaiting evals. Pain meds prn, table at this time. Pt lives w spouse. See plan below. All questions answered at the bedside. Assessment & Plan:     Principal Problem:    Fall at home, initial encounter, resulting in several closed nondisplaced fracture of metatarsal bone of right foot  Plan: follow up w Dr Malia Evans office out pt in 3 weeks  Place boot per ortho, then can work w PT  She can be weightbearing as tolerated in the boot on her heel only and just try to keep weight off of her forefoot. She does not need any surgical intervention per Dr Rodríguez [facility].   MRI pending  PT/OT to eval for plans to DC to rehab vs home w services  Pt has crutches but hard to use bc she injured her shoulder in the fall as well  PPD placed in case of rehab cms consulted  Pain meds prn  Full code    Active Problems:    Osteoporosis  Plan: noted         GERD  Plan: on PPI    PT/OT evals and (KLOR-CON M) extended release tablet 40 mEq  40 mEq Oral PRN    Or    potassium bicarb-citric acid (EFFER-K) effervescent tablet 40 mEq  40 mEq Oral PRN    Or    potassium chloride 10 mEq/100 mL IVPB (Peripheral Line)  10 mEq IntraVENous PRN    magnesium sulfate 2000 mg in 50 mL IVPB premix  2,000 mg IntraVENous PRN    enoxaparin (LOVENOX) injection 40 mg  40 mg SubCUTAneous Daily    ondansetron (ZOFRAN-ODT) disintegrating tablet 4 mg  4 mg Oral Q8H PRN    Or    ondansetron (ZOFRAN) injection 4 mg  4 mg IntraVENous Q6H PRN    polyethylene glycol (GLYCOLAX) packet 17 g  17 g Oral Daily PRN    acetaminophen (TYLENOL) tablet 650 mg  650 mg Oral Q6H PRN    Or    acetaminophen (TYLENOL) suppository 650 mg  650 mg Rectal Q6H PRN    tuberculin injection 5 Units  5 Units IntraDERmal Once       Signed:  Zoey Fletcher, APRN - CNP    Part of this note may have been written by using a voice dictation software. The note has been proof read but may still contain some grammatical/other typographical errors.

## 2023-11-09 NOTE — ED NOTES
Pt sitting in wheelchair. States in no pain at this time but pain reaches 10/10 with movement.      Luzma Cohen  11/08/23 2044

## 2023-11-09 NOTE — ED PROVIDER NOTES
Mild global cerebral volume loss. Mild chronic   small vessel ischemic changes in the white matter and mild intracranial   atherosclerosis. Bones and extracranial soft tissues:     No fracture or focal osseous lesion in the calvarium or skull base. Paranasal   sinuses are clear where visualized. Mastoid air cells and middle ear cavities   are clear bilaterally. No acute findings in the orbits. Cervical spine CT findings:    No cervical spine fracture. No focal osseous lesions. Alignment is intact. Impression    1. No acute intracranial abnormality. 2. No fracture of the calvarium, skull base or cervical spine. This examination was interpreted by SHERMAN Nation M.D.   11/8/2023 8:29:00 PM   XR SHOULDER RIGHT (MIN 2 VIEWS)    Narrative    Examination: XR SHOULDER RIGHT (MIN 2 VIEWS)    Indication: Pain after fall    Comparison: No prior study is available for comparison. Findings: There is no evidence of acute fracture or dislocation. The humeral head is   seated on the glenoid. The acromioclavicular joint is unremarkable. The imaged   lung apex is clear. Impression    Impression:  No acute osseous abnormality is identified.      Jodie Cardenas M.D.   11/8/2023 7:01:00 PM   CBC with Auto Differential   Result Value Ref Range    WBC 7.0 4.3 - 11.1 K/uL    RBC 4.49 4.05 - 5.2 M/uL    Hemoglobin 13.6 11.7 - 15.4 g/dL    Hematocrit 41.9 35.8 - 46.3 %    MCV 93.3 82.0 - 102.0 FL    MCH 30.3 26.1 - 32.9 PG    MCHC 32.5 31.4 - 35.0 g/dL    RDW 12.8 11.9 - 14.6 %    Platelets 078 028 - 965 K/uL    MPV 9.0 (L) 9.4 - 12.3 FL    nRBC 0.00 0.0 - 0.2 K/uL    Differential Type AUTOMATED      Neutrophils % 61 43 - 78 %    Lymphocytes % 26 13 - 44 %    Monocytes % 10 4.0 - 12.0 %    Eosinophils % 1 0.5 - 7.8 %    Basophils % 1 0.0 - 2.0 %    Immature Granulocytes 0 0.0 - 5.0 %    Neutrophils Absolute 4.3 1.7 - 8.2 K/UL    Lymphocytes Absolute 1.8 0.5 - 4.6 K/UL Monocytes Absolute 0.7 0.1 - 1.3 K/UL    Eosinophils Absolute 0.1 0.0 - 0.8 K/UL    Basophils Absolute 0.0 0.0 - 0.2 K/UL    Absolute Immature Granulocyte 0.0 0.0 - 0.5 K/UL   Comprehensive Metabolic Panel   Result Value Ref Range    Sodium 134 133 - 143 mmol/L    Potassium 3.9 3.5 - 5.1 mmol/L    Chloride 101 101 - 110 mmol/L    CO2 26 21 - 32 mmol/L    Anion Gap 7 2 - 11 mmol/L    Glucose 110 (H) 65 - 100 mg/dL    BUN 14 8 - 23 MG/DL    Creatinine 0.76 0.6 - 1.0 MG/DL    Est, Glom Filt Rate >60 >60 ml/min/1.73m2    Calcium 9.1 8.3 - 10.4 MG/DL    Total Bilirubin 0.8 0.2 - 1.1 MG/DL    ALT 32 12 - 65 U/L    AST 30 15 - 37 U/L    Alk Phosphatase 80 50 - 136 U/L    Total Protein 7.4 6.3 - 8.2 g/dL    Albumin 3.8 3.2 - 4.6 g/dL    Globulin 3.6 2.8 - 4.5 g/dL    Albumin/Globulin Ratio 1.1 0.4 - 1.6     Urinalysis with Reflex to Culture    Specimen: Urine   Result Value Ref Range    Color, UA YELLOW/STRAW      Appearance CLEAR      Specific Gravity, UA 1.018 1.001 - 1.023      pH, Urine 6.5 5.0 - 9.0      Protein, UA Negative NEG mg/dL    Glucose, UA Negative mg/dL    Ketones, Urine 40 (A) NEG mg/dL    Bilirubin Urine Negative NEG      Blood, Urine Negative NEG      Urobilinogen, Urine 1.0 0.2 - 1.0 EU/dL    Nitrite, Urine Negative NEG      Leukocyte Esterase, Urine TRACE (A) NEG      Urine Culture if Indicated PENDING     WBC, UA 0-4 U4 /hpf    RBC, UA 0-5 U5 /hpf    BACTERIA, URINE Negative NEG /hpf    Epithelial Cells UA 0-5 U5 /hpf    Casts 2-5 (A) U2 /lpf        CT FOOT RIGHT WO CONTRAST   Final Result   Multiple fractures as described above, primarily centered along the Lisfranc    joint/tarsometatarsal joints. MRI is suggested to evaluate for the possibility    of additional occult midfoot fractures. Geovanna Bueno M.D.    11/8/2023 8:33:00 PM      CT HEAD WO CONTRAST   Final Result      1. No acute intracranial abnormality. 2. No fracture of the calvarium, skull base or cervical spine.              This

## 2023-11-09 NOTE — CARE COORDINATION
Discharge planning was discussed with the physical therapist and occupational therapist. Short term rehabilitation has been recommended. RN CM met with the patient and informed her of the recommendation for short term rehabilitation. She confirmed she is in agreement with the recommendation. The patient was provided with a list of skilled nursing facilities and their quality metrics. She was also provided with a list of skilled nursing facilities and their quality metrics for [facility]. The list was printed from https://www.toñito.org/. She requested for case management to send referrals to the facilities in [facility] with four and five stars. RAMÓN SANCHEZ called [facility] and confirmed their fax number with read back, [phone], and faxed the referral in [employer]. RN CM called [facility] at [phone] and confirmed the fax number with read back, [phone]. The referral was faxed in [address] [place] [address] and is pending review. Referrals were also faxed in [employer] to [facility], [address] [place] [address] of "[employer].

## 2023-11-09 NOTE — ED NOTES
TRANSFER - OUT REPORT:    Verbal report given to RAMÓN Santiago on [place]  being transferred to [address] [place] [address] for routine progression of patient care       Report consisted of patient's Situation, Background, Assessment and   Recommendations(SBAR). Information from the following report(s) ED SBAR was reviewed with the receiving nurse. Lines:   Peripheral IV 11/08/23 Left Antecubital (Active)   Site Assessment Clean, dry & intact 11/08/23 2140   Line Status Brisk blood return 11/08/23 2140   Phlebitis Assessment No symptoms 11/08/23 2140   Infiltration Assessment 0 11/08/23 2140   Dressing Status New dressing applied;Clean, dry & intact 11/08/23 2140   Dressing Type Transparent 11/08/23 2140   Dressing Intervention New 11/08/23 2140        Opportunity for questions and clarification was provided.       Patient transported with:  Registered Nurse      Luzma Potter  11/08/23 0917

## 2023-11-09 NOTE — CARE COORDINATION
11/09/23 1107   Service Assessment   Patient Orientation Alert and Oriented   Cognition Alert   History Provided By Patient   Primary [facility]   PCP Verified by CM Yes   Prior Functional Level Independent in ADLs/IADLs   Current Functional Level Assistance with the following:   Can patient return to prior living arrangement Yes   Ability to make needs known: Good   Family able to assist with home care needs: Yes   Would you like for me to discuss the discharge plan with any other family members/significant others, and if so, who? Yes  (son Melo)   [address] [facility] [address] Other (Comment)  (She was provided with caregiver resources, an [employer], and information on [employer] and [facility].)   Social/Functional History   Lives With Son   Type of [address] Shower/Tub None   [address]   Mode of Transportation Car   Discharge Planning   Type of [address] Medications No   Services At/After Discharge   Transition of [address] [place] [address]  (CM Consult) Discharge [address] [facility] At/After Discharge Community Resources   Confirm Follow Up Transport Family   Condition of Participation: Discharge Planning   The Patient and/or Patient Representative was provided with a Choice of Provider? Patient   The Patient and/Or Patient Representative agree with the Discharge Plan? Yes   Freedom of Choice list was provided with basic dialogue that supports the patient's individualized plan of care/goals, treatment preferences, and shares the quality data associated with the providers? Yes     RN LAURA met with the patient at the bedside and discussed discharge planning. She lives in a private residence with her son Jessica Walker and plans on returning home at discharge. She is pending therapy evaluations.  The patient was provided with

## 2023-11-09 NOTE — H&P
Hospitalist History and Physical   Admit Date:  2023  6:28 PM   Name:  Annette Guillen   Age:  75 y.o. Sex:  female  :  1950   MRN:  [id number]   Room:  [facility]    Presenting/Chief Complaint: Fall, broken foot, Rib Injury, and Shoulder Pain     Reason(s) for Admission: Fall at home, initial encounter [W19. Charmayne Post, I75.658]     History of Present Illness:   Annette Guillen is a 75 y.o. female with medical history of osteoporosis who presented with fall. She had mechanical fall down some stairs. She was seen in the emergency department [facility]. Given pain medication and crutches. She is unable to use crutches due to also having shoulder in the fall. On CT she was noted to have fractures of 4 metatarsal bones right foot. She is admitted for further evaluation management. Assessment & Plan:   Fall at home, initial encounter  -PT, OT, PPD, CM    Closed nondisplaced fracture of metatarsal bone of right foot  Admission CT with multiple metatarsal fractures at varying angles  -MRI foot  -Consult orthopedic surgery  -Norco    Osteoporosis  Noted      PT/OT evals and PPD needed/ordered? Yes  Diet: ADULT DIET; Regular  VTE prophylaxis:  enoxaparin  Code status: Full Code      Non-peripheral Lines and Tubes (if present):             Hospital Problems:  Principal Problem:    Fall at home, initial encounter  Active Problems:    Closed nondisplaced fracture of metatarsal bone of right foot    Osteoporosis  Resolved Problems:    * No resolved hospital problems.  *      Past History:     Past Medical History:   Diagnosis Date    Diverticulitis of colon with perforation 10/21/2018    Menopause     @51       Past Surgical History:   Procedure Laterality Date    APPENDECTOMY      HIP FRACTURE SURGERY Left         Social History     Tobacco Use    Smoking status: Never    Smokeless tobacco: Never   Substance Use Topics    Alcohol use: No      Social History     Substance and Sexual Activity   Drug Use No No fracture or periosteal reaction. No joint effusion. Normal joint space with no reactive degenerative change. No focal lytic or sclerotic lesion. No evident soft tissue abnormality. Normal.    XR FOOT RIGHT (MIN 3 VIEWS)    Result Date: 11/7/2023  Right foot AP, lateral, and oblique: 11/07/23 INDICATION: fall, dorsal mid-foot tenderness/swelling fall down steps COMPARISON: None FINDINGS: Displaced proximal second metatarsal fracture likely comminuted. There  is also subtle lucency across the base of the fourth metatarsal seen only on the AP projection. Abnormal contour over the distal margin of the first cuneiform as well, suspected fracture or No other definite fracture by radiography. Minimal posterior calcaneal spurring. Mid foot fracture and suspected fractures as above. This is concerning for Lisfranc fracture dislocation. Recommend CT for more specific characterization.         Signed:  Thad Urena MD

## 2023-11-10 ENCOUNTER — APPOINTMENT (OUTPATIENT)
Dept: MRI IMAGING | Age: 73
End: 2023-11-10
Payer: MEDICARE

## 2023-11-10 PROBLEM — M25.511 ACUTE SHOULDER PAIN DUE TO TRAUMA, RIGHT: Status: ACTIVE | Noted: 2023-11-10

## 2023-11-10 PROBLEM — G89.11 ACUTE SHOULDER PAIN DUE TO TRAUMA, RIGHT: Status: ACTIVE | Noted: 2023-11-10

## 2023-11-10 LAB
ANION GAP SERPL CALC-SCNC: 4 MMOL/L (ref 2–11)
BUN SERPL-MCNC: 19 MG/DL (ref 8–23)
CALCIUM SERPL-MCNC: 8.2 MG/DL (ref 8.3–10.4)
CHLORIDE SERPL-SCNC: 105 MMOL/L (ref 101–110)
CO2 SERPL-SCNC: 25 MMOL/L (ref 21–32)
CREAT SERPL-MCNC: 0.64 MG/DL (ref 0.6–1)
ERYTHROCYTE [DISTWIDTH] IN BLOOD BY AUTOMATED COUNT: 12.8 % (ref 11.9–14.6)
GLUCOSE SERPL-MCNC: 105 MG/DL (ref 65–100)
HCT VFR BLD AUTO: 36.1 % (ref 35.8–46.3)
HGB BLD-MCNC: 11.8 G/DL (ref 11.7–15.4)
MCH RBC QN AUTO: 30.3 PG (ref 26.1–32.9)
MCHC RBC AUTO-ENTMCNC: 32.7 G/DL (ref 31.4–35)
MCV RBC AUTO: 92.8 FL (ref 82–102)
MM INDURATION, POC: 0 MM (ref 0–5)
NRBC # BLD: 0 K/UL (ref 0–0.2)
PLATELET # BLD AUTO: 189 K/UL (ref 150–450)
PMV BLD AUTO: 9.1 FL (ref 9.4–12.3)
POTASSIUM SERPL-SCNC: 3.8 MMOL/L (ref 3.5–5.1)
PPD, POC: NEGATIVE
RBC # BLD AUTO: 3.89 M/UL (ref 4.05–5.2)
SODIUM SERPL-SCNC: 134 MMOL/L (ref 133–143)
WBC # BLD AUTO: 4.9 K/UL (ref 4.3–11.1)

## 2023-11-10 PROCEDURE — 36415 COLL VENOUS BLD VENIPUNCTURE: CPT

## 2023-11-10 PROCEDURE — 6360000002 HC RX W HCPCS: Performed by: FAMILY MEDICINE

## 2023-11-10 PROCEDURE — 80048 BASIC METABOLIC PNL TOTAL CA: CPT

## 2023-11-10 PROCEDURE — 6370000000 HC RX 637 (ALT 250 FOR IP): Performed by: FAMILY MEDICINE

## 2023-11-10 PROCEDURE — 85027 COMPLETE CBC AUTOMATED: CPT

## 2023-11-10 PROCEDURE — 97530 THERAPEUTIC ACTIVITIES: CPT

## 2023-11-10 PROCEDURE — 2580000003 HC RX 258: Performed by: FAMILY MEDICINE

## 2023-11-10 PROCEDURE — 1100000000 HC RM PRIVATE

## 2023-11-10 PROCEDURE — 6370000000 HC RX 637 (ALT 250 FOR IP): Performed by: NURSE PRACTITIONER

## 2023-11-10 PROCEDURE — 73221 MRI JOINT UPR EXTREM W/O DYE: CPT

## 2023-11-10 RX ORDER — LIDOCAINE 4 G/G
1 PATCH TOPICAL DAILY
Status: DISCONTINUED | OUTPATIENT
Start: 2023-11-10 | End: 2023-11-11 | Stop reason: HOSPADM

## 2023-11-10 RX ADMIN — DICLOFENAC SODIUM 2 G: 10 GEL TOPICAL at 22:33

## 2023-11-10 RX ADMIN — ENOXAPARIN SODIUM 40 MG: 100 INJECTION SUBCUTANEOUS at 09:06

## 2023-11-10 RX ADMIN — ACETAMINOPHEN 650 MG: 325 TABLET, FILM COATED ORAL at 09:18

## 2023-11-10 RX ADMIN — SODIUM CHLORIDE, PRESERVATIVE FREE 10 ML: 5 INJECTION INTRAVENOUS at 09:05

## 2023-11-10 RX ADMIN — SODIUM CHLORIDE, PRESERVATIVE FREE 10 ML: 5 INJECTION INTRAVENOUS at 21:47

## 2023-11-10 NOTE — CARE COORDINATION
RN CM called [facility] at [phone] and left a HIPPA compliant voicemail with Fina Vee in Admissions requesting a return phone call. RAMÓN SANCHEZ spoke with Yumiko Roper at [facility], [phone]. She confirmed she can offer the patient a bed for short term rehabilitation and can accept her on Monday November 13, 2023. RAMÓN SANCHEZ called [facility] at [phone] and left a HIPPA compliant voicemail with Rosa Valladares in Admissions requesting a return phone call. [phone] and left a HIPPA compliant voicemail requesting a return phone call. RMAÓN SANCHEZ called [facility] at [phone] but was unable to reach them by telephone.

## 2023-11-10 NOTE — PROGRESS NOTES
Hospitalist Progress Note   Admit Date:  2023  6:28 PM   Name:  Vivienne Spicer   Age:  75 y.o. Sex:  female  :  1950   MRN:  [id number]   Room:  [facility]/    Presenting Complaint: Fall, broken foot, Rib Injury, and Shoulder Pain     Reason(s) for Admission: Fall at home, initial encounter [W19. Hernandez Guerrero, D18.106]     Hospital Course:   Vivienne Spicer is a 75 y.o. female with medical history of osteoporosis who presented with fall. She had mechanical fall down some stairs. She was seen in the emergency department [facility] on . Given pain medication and crutche, sent home and told to see ortho. She is unable to use crutches due to also having shoulder pain 2/2 fall. On CT she was noted to have fractures of 4 metatarsal bones right foot. She is admitted for further evaluation management. May need rehab. ROUNDS 23:  Pt resting in bed. Discussed Dr Aliyah Mack review of films and recommendations. Pt to have boot placed today and work w PT/OT to see if she can go home w services vs need for rehab. Awaiting evals. Pain meds prn, table at this time. Pt lives w spouse. See plan below. All questions answered at the bedside. Subjective & 24hr Events (11/10/23):   MRI showed mid foot fractures as suspected. Boot fitted . PT/OT do recommend STR. CMS aware, bed requests have been made. Hope to DC to rehab in next 1-2 days. Continue to work w PT/OT wearing boot until DC. Still having rt shoulder pain / stiffness. Had frozen shoulder in the past, wants to get PT to start working w her on this shoulder. Ordered Voltaren gel for this area BID and a Lidoderm patch to the shoulder blade. Went over unremarkable labs, no need to continue these draws daily. VS good. All questions answered at bedside.      Assessment & Plan:     Principal Problem:    Fall at home, initial encounter, resulting in several closed nondisplaced fracture of metatarsal bone of right foot  Plan: follow up w Dr Aliyah Mack <200     Albumin    Collection Time: 11/09/23  5:43 AM   Result Value Ref Range    Albumin 3.2 3.2 - 4.6 g/dL   Basic Metabolic Panel w/ Reflex to MG    Collection Time: 11/09/23  5:43 AM   Result Value Ref Range    Sodium 136 133 - 143 mmol/L    Potassium 3.6 3.5 - 5.1 mmol/L    Chloride 103 101 - 110 mmol/L    CO2 25 21 - 32 mmol/L    Anion Gap 8 2 - 11 mmol/L    Glucose 97 65 - 100 mg/dL    BUN 13 8 - 23 MG/DL    Creatinine 0.64 0.6 - 1.0 MG/DL    Est, Glom Filt Rate >60 >60 ml/min/1.73m2    Calcium 8.5 8.3 - 10.4 MG/DL   Procalcitonin    Collection Time: 11/09/23  5:43 AM   Result Value Ref Range    Procalcitonin <0.05 0.00 - 0.49 ng/mL   PLEASE READ & DOCUMENT PPD TEST IN 24 HRS    Collection Time: 11/10/23 12:00 AM   Result Value Ref Range    PPD, (POC) Negative Negative    mm Induration 0 0 - 5 mm   CBC    Collection Time: 11/10/23  5:44 AM   Result Value Ref Range    WBC 4.9 4.3 - 11.1 K/uL    RBC 3.89 (L) 4.05 - 5.2 M/uL    Hemoglobin 11.8 11.7 - 15.4 g/dL    Hematocrit 36.1 35.8 - 46.3 %    MCV 92.8 82.0 - 102.0 FL    MCH 30.3 26.1 - 32.9 PG    MCHC 32.7 31.4 - 35.0 g/dL    RDW 12.8 11.9 - 14.6 %    Platelets 259 209 - 559 K/uL    MPV 9.1 (L) 9.4 - 12.3 FL    nRBC 0.00 0.0 - 0.2 K/uL   Basic Metabolic Panel w/ Reflex to MG    Collection Time: 11/10/23  5:44 AM   Result Value Ref Range    Sodium 134 133 - 143 mmol/L    Potassium 3.8 3.5 - 5.1 mmol/L    Chloride 105 101 - 110 mmol/L    CO2 25 21 - 32 mmol/L    Anion Gap 4 2 - 11 mmol/L    Glucose 105 (H) 65 - 100 mg/dL    BUN 19 8 - 23 MG/DL    Creatinine 0.64 0.6 - 1.0 MG/DL    Est, Glom Filt Rate >60 >60 ml/min/1.73m2    Calcium 8.2 (L) 8.3 - 10.4 MG/DL       I have personally reviewed imaging studies:  Other Studies:  MRI FOOT RIGHT WO CONTRAST   Final Result   Multiple midfoot fractures are again seen, as were seen on the most   recent prior CT scan. Though the ligament of Lisfranc is intact, cannot exclude   functional Lisfranc injury.       CT FOOT Line)  10 mEq IntraVENous PRN    magnesium sulfate 2000 mg in 50 mL IVPB premix  2,000 mg IntraVENous PRN    enoxaparin (LOVENOX) injection 40 mg  40 mg SubCUTAneous Daily    ondansetron (ZOFRAN-ODT) disintegrating tablet 4 mg  4 mg Oral Q8H PRN    Or    ondansetron (ZOFRAN) injection 4 mg  4 mg IntraVENous Q6H PRN    polyethylene glycol (GLYCOLAX) packet 17 g  17 g Oral Daily PRN    acetaminophen (TYLENOL) tablet 650 mg  650 mg Oral Q6H PRN    Or    acetaminophen (TYLENOL) suppository 650 mg  650 mg Rectal Q6H PRN       Signed:  Cesar Park, APRN - CNP    Part of this note may have been written by using a voice dictation software. The note has been proof read but may still contain some grammatical/other typographical errors.

## 2023-11-10 NOTE — PROGRESS NOTES
Pt continues to have pain in her rt shoulder and limited ROM per therapy assessment today. Most likely a result of the fall and xrays were negative then but patient has had frozen shoulder in the past and is worried that there may be a rotator cuff tear post fall that was not picked up on during xray. Requesting MRI and and I think that is a valid request after speaking with therapy.    Forrest Burgess, NP

## 2023-11-10 NOTE — CARE COORDINATION
1319: Phone call received from [facility] reporting they can accept the patient tomorrow if the patient can complete the admission paperwork. Return phone call received from [facility]. They are unable to accept the patient for services if she will have follow up appointments in [place]. Update 1416: Phone call received from [facility] with [facility]. She confirmed she received the patient's admission paperwork and can admit the patient tomorrow. The primary nurse can call report to [phone] room 117. Clinicals and orders will need to be faxed to [phone]. The phone number and fax number were confirmed with read back. The facility does not need a COVID test unless the patient is showing signs and symptoms of COVID 19. They prefer for the patient to arrive prior to 1700 tomorrow. Update 24-20-52-61: Phone call received from [facility]. They can accept the patient for services on November 13, 2023 if the patient is interested in their facility. RN LAURA spoke with the patient and informed her of the new bed offer. She stated she still wants to discharge to [facility].

## 2023-11-10 NOTE — PROGRESS NOTES
Reviewed MRI shoulder, slight humerus frx w some muscle tearing. Spoke w PA for orthopedic surgery, Tyesha Mackenzie who rev'd MRI. Stated to manage non-operatively. He will place order for sling. NWB on RUE. No PT right now. See them out pt in 3 weeks as planned w the foot frx already. Alerted RAMÓN Holly to watch for Sling order, obtain from facilities and place on pt when available. Will discuss more w pt tomorrow on am rounds.    Virginia lFynn NP

## 2023-11-10 NOTE — PROGRESS NOTES
bedrails?: A Little  How much help is needed moving to and from a bed to a chair?: A Lot  How much help is needed standing up from a chair using your arms?: A Little  How much help is needed walking in hospital room?: A Lot  How much help is needed climbing 3-5 steps with a railing?: A Lot  AM-PAC Inpatient Mobility Raw Score : 15  AM-PAC Inpatient T-Scale Score : 39.45  Mobility Inpatient CMS 0-100% Score: 57.7  Mobility Inpatient CMS G-Code Modifier : CK    SUBJECTIVE:   Ms. Maranda Munroe states, \"I don't want to do anything I am not supposed to\"     Social/Functional Lives With: Son  Type of Home: House  Bathroom Shower/Tub: Walk-in shower  Bathroom Equipment: None  Home Equipment: Crutches  Mode of Transportation: Car    OBJECTIVE:     PAIN: [place] / O2: Bernabe Tyler / Lisseth Ardon / Erica Noss:   Pre Treatment: pt with right shoulder pain         Post Treatment: R shoulder pain Vitals        Oxygenon room air      External Catheter    RESTRICTIONS/PRECAUTIONS:  Restrictions/Precautions: Weight Bearing  Required Braces or Orthoses?: Yes  Right Lower Extremity Weight Bearing:  (WBAT through heel only in boot)              GROSS EVALUATION:  Intact Impaired (Comments):   AROM []  Generally decreased, functional  except right shoulder limited due to pain-unable to move actively   PROM []    Strength []  Generally decreased, functional, R shoulder not assessed   Balance []     Posture [] Forward Head  Rounded Shoulders   Sensation [x]     Coordination []   Generally decreased, functional    Tone [x]     Edema []    Activity Tolerance [] Patient limited by fatigue, Patient limited by pain    []      COGNITION/  PERCEPTION: Intact Impaired (Comments):   Orientation [x]     Vision [x]     Hearing [x]     Cognition  [x]       MOBILITY: I Mod I S SBA CGA Min Mod Max Total  NT x2 Comments:   Bed Mobility    Rolling [] [] [] [] [] [] [] [] [] [] []    Supine to Sit [] [] [] [] [] [x] [] [] [] [] []    Scooting [] [] [] [] [] [x] [] [] [] [] []    Sit to Supine [] [] [] [] [] [x] [] [] [] [] []    Transfers    Sit to Stand [] [] [] [] [] [x] [] [] [] [] []    Bed to Chair [] [] [] [] [] [] [] [] [] [] []    Stand to Sit [] [] [] [] [] [x] [] [] [] [] []     [] [] [] [] [] [] [] [] [] [] []    I=Independent, Mod I=Modified Independent, S=Supervision, SBA=Standby Assistance, CGA=Contact Guard Assistance,   Min=Minimal Assistance, Mod=Moderate Assistance, Max=Maximal Assistance, Total=Total Assistance, NT=Not Tested    GAIT: I Mod I S SBA CGA Min Mod Max Total  NT x2 Comments:   Level of Assistance [] [] [] [] [] [x] [] [] [] [] []    Distance   Few steps to left and then about 5 steps forward and back    DME Rolling Walker    Gait Quality Antalgic, Decreased john , Decreased step clearance, Decreased step length, Decreased stance, and heel WB only on right-in boot    Weightbearing Status Restrictions/Precautions  Restrictions/Precautions: Weight Bearing  Required Braces or Orthoses?: Yes  Lower Extremity Weight Bearing Restrictions  Right Lower Extremity Weight Bearing:  (WBAT through heel only in boot)    Stairs      I=Independent, Mod I=Modified Independent, S=Supervision, SBA=Standby Assistance, CGA=Contact Guard Assistance,   Min=Minimal Assistance, Mod=Moderate Assistance, Max=Maximal Assistance, Total=Total Assistance, NT=Not Tested    PLAN:   FREQUENCY AND DURATION: Daily for duration of hospital stay or until stated goals are met, whichever comes first.    THERAPY PROGNOSIS: Good    PROBLEM LIST:   (Skilled intervention is medically necessary to address:)  Decreased ADL/Functional Activities  Decreased Activity Tolerance  Decreased AROM/PROM  Decreased Balance  Decreased Coordination  Decreased Gait Ability  Decreased Strength  Decreased Transfer Abilities INTERVENTIONS PLANNED:   (Benefits and precautions of physical therapy have been discussed with the patient.)  Therapeutic Activity  Therapeutic Exercise/HEP  Gait Training  Education TREATMENT:   TREATMENT:   Therapeutic Activity (25 Minutes): Therapeutic activity included Supine to Sit, Scooting, Transfer Training, Ambulation on level ground, Sitting balance , Standing balance, and some right upper extremity gentle repetitive motion to improve functional Activity tolerance, Balance, Coordination, Mobility, Strength, and ROM.     TREATMENT GRID:  N/A    AFTER TREATMENT PRECAUTIONS: Bed, Bed/Chair Locked, Call light within reach, and Needs within reach    INTERDISCIPLINARY COLLABORATION:  RN/ PCT, OT/ HERNANDEZ, and RN Case Manager/      EDUCATION: Education Outcome: Continued education needed    TIME IN/OUT:  Time In: 1110  Time Out: 1138  Minutes: [address], PT

## 2023-11-10 NOTE — CARE COORDINATION
RN CM met with the patient at the bedside and informed her of her bed offer at [facility]. She has not received any other bed offers. She stated she would like to accept the bed offer at [facility]. The bed offer was accepted in Epic. RN CM spoke with Kendall Chairez at [facility] and informed her of the patient's acceptance of the bed offer. She stated she is going to call the patient. She also stated there is a possibility they may be able to accept the patient tomorrow.

## 2023-11-10 NOTE — PLAN OF CARE
Problem: Discharge Planning  Goal: Discharge to home or other facility with appropriate resources  11/10/2023 0555 by Anthony Chung RN  Outcome: Progressing  11/10/2023 0555 by Anthony Chung RN  Outcome: Progressing     Problem: Pain  Goal: Verbalizes/displays adequate comfort level or baseline comfort level  11/10/2023 0555 by Anthony Chung RN  Outcome: Progressing  11/10/2023 0555 by Anthony Chung RN  Outcome: Progressing     Problem: Safety - Adult  Goal: Free from fall injury  11/10/2023 0555 by Anthony Chung RN  Outcome: Progressing  11/10/2023 0555 by Anthony Chung RN  Outcome: Progressing

## 2023-11-10 NOTE — PROGRESS NOTES
NP called.  MD wants sling to be place on patient R shoulder and arm and per MD request PT should no longer work with patient R shoulder

## 2023-11-11 VITALS
BODY MASS INDEX: 27.32 KG/M2 | DIASTOLIC BLOOD PRESSURE: 80 MMHG | WEIGHT: 170 LBS | RESPIRATION RATE: 16 BRPM | HEIGHT: 66 IN | HEART RATE: 78 BPM | SYSTOLIC BLOOD PRESSURE: 137 MMHG | OXYGEN SATURATION: 94 % | TEMPERATURE: 98.2 F

## 2023-11-11 PROBLEM — S42.214A CLOSED NONDISPLACED FRACTURE OF SURGICAL NECK OF RIGHT HUMERUS: Status: ACTIVE | Noted: 2023-11-11

## 2023-11-11 LAB
MM INDURATION, POC: 0 MM (ref 0–5)
PPD, POC: NEGATIVE
SARS-COV-2 RDRP RESP QL NAA+PROBE: NOT DETECTED
SOURCE: NORMAL

## 2023-11-11 PROCEDURE — 87635 SARS-COV-2 COVID-19 AMP PRB: CPT

## 2023-11-11 PROCEDURE — 97530 THERAPEUTIC ACTIVITIES: CPT

## 2023-11-11 PROCEDURE — 99232 SBSQ HOSP IP/OBS MODERATE 35: CPT | Performed by: PHYSICIAN ASSISTANT

## 2023-11-11 PROCEDURE — 6360000002 HC RX W HCPCS: Performed by: FAMILY MEDICINE

## 2023-11-11 RX ORDER — HYDROCODONE BITARTRATE AND ACETAMINOPHEN 5; 325 MG/1; MG/1
1 TABLET ORAL EVERY 6 HOURS PRN
Qty: 12 TABLET | Refills: 0 | Status: SHIPPED | OUTPATIENT
Start: 2023-11-11 | End: 2023-11-14

## 2023-11-11 RX ORDER — LIDOCAINE 4 G/G
1 PATCH TOPICAL DAILY
Qty: 7 EACH | Refills: 0 | Status: SHIPPED | OUTPATIENT
Start: 2023-11-11 | End: 2023-11-18

## 2023-11-11 RX ADMIN — ENOXAPARIN SODIUM 40 MG: 100 INJECTION SUBCUTANEOUS at 08:35

## 2023-11-11 RX ADMIN — DICLOFENAC SODIUM 2 G: 10 GEL TOPICAL at 08:32

## 2023-11-11 ASSESSMENT — PAIN SCALES - GENERAL: PAINLEVEL_OUTOF10: 3

## 2023-11-11 ASSESSMENT — PAIN DESCRIPTION - PAIN TYPE: TYPE: ACUTE PAIN

## 2023-11-11 ASSESSMENT — PAIN DESCRIPTION - LOCATION: LOCATION: SHOULDER;ARM

## 2023-11-11 ASSESSMENT — PAIN DESCRIPTION - ORIENTATION: ORIENTATION: RIGHT

## 2023-11-11 ASSESSMENT — PAIN DESCRIPTION - DESCRIPTORS: DESCRIPTORS: ACHING

## 2023-11-11 NOTE — DISCHARGE INSTRUCTIONS
You can remove rt shoulder sling to shower  No physical therapy on the rt shoulder. It needs to heal. Use sling all the time (except showering), even when sleeping. You can use the voltaren gel to the shoulder joint to help w pain and the lidoderm patches to the right shoulder blade as needed. I will give you a few days of norco pain medication to use when pain is a 6/10 or higher. If 5/10 or less, try to use tylenol or ibuprofen in stead. Wear your boot on the right foot for multiple fractures  Your physical therapy at rehab should be dealing with the right foot and ambulation. You will need to see Dr Tiana Foster office (orthopedics) in 3 weeks to discuss the healing of the foot fracture and humurus fracture. Call Monday to make that 3 week appt with them.

## 2023-11-11 NOTE — DISCHARGE SUMMARY
Hospitalist Discharge Summary   Admit Date:  2023  6:28 PM   DC Note date: 2023  Name:  Drea Tolbert   Age:  75 y.o. Sex:  female  :  1950   MRN:  [id number]   Room:  [facility]  PCP:  James Pollard MD    Presenting Complaint: Fall, broken foot, Rib Injury, and Shoulder Pain     Initial Admission Diagnosis: Fall at home, initial encounter [W19. [place] Evie, Y92.009]     Problem List for this Hospitalization (present on admission):    Principal Problem:    Fall at home, initial encounter  Active Problems:    Osteoporosis    Closed nondisplaced fracture of metatarsal bone of right foot    Acute shoulder pain due to trauma, right    Closed nondisplaced fracture of surgical neck of right humerus  Resolved Problems:    * No resolved hospital problems. *      Hospital Course:  Drea Tolbert is a 75 y.o. female with medical history of osteoporosis who presented with fall. She had mechanical fall down some stairs. She was seen in the emergency department [address] [place] [address] on . Given pain medication and crutches, sent home and told to see ortho. She was unable to use crutches due to also having shoulder pain 2/2 fall. On CT she was noted to have fractures of 4 metatarsal bones right foot. She is admitted for further evaluation management. May need rehab. Work up included:  Labs and VS unremarkable  Xray left hand- Osteopenia and erosive osteoarthritis. Negative for acute bony injury. Xr right shoulder- Mild cortical irregularity of the posterolateral aspect of the humeral head, which may reflect an age-indeterminate Hill-Sachs lesion. Otherwise no fractures identified. No shoulder dislocation. Limited views of the chest demonstrate aortic atherosclerosis and probable right basilar   atelectasis. Xr left knee - No fracture or periosteal reaction. No joint effusion. Normal joint space with no reactive degenerative change. No focal lytic or sclerotic lesion.  No evident soft tissue higher. If 5/10 or less, try to use tylenol or ibuprofen in stead. Wear your boot on the right foot for multiple fractures  Your physical therapy at rehab should be dealing with the right foot and ambulation. You will need to see Dr Merline Redwood office (orthopedics) in 3 weeks to discuss the healing of the foot fracture and humurus fracture. Call Monday to make that 3 week appt with them. Time spent in patient discharge and coordination 32 minutes. Follow up labs/diagnostics (ultimately defer to outpatient provider): May need additional imaging at follow up appt w ortho in 3 weeks. Plan was discussed with pt, RN, CMS, attending Dr Sari Espinoza.  All questions answered. Patient was stable at time of discharge. Instructions given to call a physician or return if any concerns. Current Discharge Medication List        START taking these medications    Details   diclofenac sodium (VOLTAREN) 1 % GEL Apply 2 g topically 2 times daily To the right shoulder joint  Qty: 100 g, Refills: 1      lidocaine 4 % external patch Place 1 patch onto the skin daily for 7 days  Qty: 7 each, Refills: 0           CONTINUE these medications which have CHANGED    Details   HYDROcodone-acetaminophen (NORCO) 5-325 MG per tablet Take 1 tablet by mouth every 6 hours as needed for Pain for up to 3 days.  Max Daily Amount: 4 tablets  Qty: 12 tablet, Refills: 0    Comments: Reduce doses taken as pain becomes manageable  Associated Diagnoses: Fall, initial encounter; Closed nondisplaced fracture of first metatarsal bone of right foot, initial encounter; Closed nondisplaced fracture of second metatarsal bone of right foot, initial encounter; Closed nondisplaced fracture of third metatarsal bone of right foot, initial encounter; Closed nondisplaced fracture of fourth metatarsal bone of right foot, initial encounter; Closed nondisplaced fracture of surgical neck of right humerus, unspecified fracture morphology, initial encounter

## 2023-11-11 NOTE — PROGRESS NOTES
[address] [place] [address]        November 11, 2023              Admit Date: 11/8/2023  Admit Diagnosis: Fall at home, initial encounter [W19. Donnell Mclaughlin, Y92.009]       Principle Problem: Fall at home, initial encounter. Subjective: Doing well, No complaints, No SOB, No Chest Pain, No Nausea or Vomiting  Continued pain in right shoulder. MRI ordered revealing nondisplaced humerus fx. Patient placed in sling. She is being discharged to SNF today. Objective:   Vital Signs are Stable, No Acute Distress, Alert,  Dressing is Dry,  Neurovascular exam is normal.  Moving fingers and toes. MRI Result (most recent):  MRI SHOULDER RIGHT WO CONTRAST 11/10/2023    Narrative  EXAMINATION: MRI SHOULDER RIGHT WO CONTRAST 11/10/2023 2:58 PM    ACCESSION NUMBER: [id number]    COMPARISON: Right shoulder x-ray 11/8/2023    INDICATION: Right shoulder pain status post falls with limited range of motion    TECHNIQUE: Dedicated MRI of the right shoulder was performed with multiplanar  multiecho technique. FINDINGS:    Alignment: Anatomical.    Fluid:  Subacromial/Subdeltoid Bursa: Small fluid within the bursa  Glenohumeral Joint: Moderate joint effusion    Acromioclavicular Arch  Acromioclavicular Joint: Mild osteoarthritis. No os acromiale. Subacromial Spur: Small ossified subacromial spur is present. Rotator Cuff:  Supraspinatus: Tendinosis with small low-grade articular surface tearing  posterior distal insertional fibers  Infraspinatus: Tendinosis without tearing  Subscapularis: Tendinosis without tearing    Biceps Tendon: Tendinosis with intrasubstance split tearing extending into the  vertical portion    Glenohumeral Joint:  Labrum: Labral degeneration with blunting. Chondral labral separation of the  anterior superior labrum. Biceps anchor appears intact. Cartilage: No full-thickness cartilage defect. Bones:  There is nondisplaced incomplete fracture of the surgical neck of the  humerus extending across the

## 2023-11-11 NOTE — CARE COORDINATION
Patient informed that she is discharging at 1 pm today. Patient states that she will inform her family. Phone call to [phone] [employer] ([phone]). Transportation arranged for 1pm today. Phone call to [facility] (P: [phone]) to inform of patient's discharge time (1pm). Discharge summary and [facility] faxed to [facility] (F: [phone]). Packet prepared with all required documents and left at nurse's station.       REBEKAH Barriga, [address] [place] [address]   [phone]

## 2023-11-11 NOTE — PROGRESS NOTES
ACUTE PHYSICAL THERAPY GOALS:   (Developed with and agreed upon by patient and/or caregiver.)  STG:  (1.)Ms. Katlyn Kumari will move from supine to sit and sit to supine  with STAND BY ASSIST with head of bed elevated and bed rail within 4-7 treatment day(s). (2.)Ms. Katlyn Kumari will transfer from bed to chair and chair to bed with CONTACT GUARD ASSIST using the least restrictive device within 4-7 treatment day(s). (3.)Ms. Katlyn Kumari will ambulate with CONTACT GUARD ASSIST for 75 feet with the least restrictive device within 4-7 treatment day(s). ________________________________________________________________________________________________     PHYSICAL THERAPY Daily Note and AM  (Link to Caseload Tracking: PT Visit Days : 3  Acknowledge Orders  Time In/Out  PT Charge Capture  Rehab Caseload Tracker    Drea Tolbert is a 75 y.o. female   PRIMARY DIAGNOSIS: Fall at home, initial encounter  Fall at home, initial encounter [W19. XXXA, F05.458]       Reason for Referral: Generalized Muscle Weakness (M62.81)  Difficulty in walking, Not elsewhere classified (R26.2)  Inpatient: Payor: Omero Montelongo / Plan: MEDICARE PART A AND B / Product Type: *No Product type* /     ASSESSMENT:     REHAB RECOMMENDATIONS:   Recommendation to date pending progress:  Setting:  Short-term Rehab    Equipment:    To Be Determined     ASSESSMENT:  Ms. Katlyn Kumari in [place] yesterday where she sustained a fall and diagnosed with right foot fracture. She is also having increased right shoulder pain but x-ray was negative. She was sent home from [place] with crutches which she has been unable to use due to her shoulder pain. She has been unable to ambulate or care for herself and came to our ED. Ortho ordered her a boot and ordered WBAT through heel only with boot.  She presents with generalized weakness, decreased balance, decreased independence with functional mobility, fall risk, and needing practice with heel weight bearing while

## 2023-11-14 NOTE — PROGRESS NOTES
Physician Progress Note      PATIENT:               Kristel Stringer  CSN #:                  [id number]  :                       1950  ADMIT DATE:       2023 6:28 PM  [facility] DATE:        2023 1:15 PM  RESPONDING  PROVIDER #:        Nasrin Salter MD          QUERY TEXT:    Pt admitted after fall with several closed nondisplaced fracture of metatarsal   bone of right foot. Pt noted to have osteoporosis. If possible, please   document in progress notes and discharge summary if you are evaluating and/or   treating any of the following: The medical record reflects the following:  Risk Factors: Age, female, osteoporosis    Clinical Indicators: Patient admitted after fall down some stairs with Closed   nondisplaced fracture of metatarsal bone of right foot. Noted to have   osteoporosis. Hand X-ray from  notes, \"Bones osteopenic\"    Treatment: MRI foot, boot, ortho consult, PT/OT  Options provided:  -- Osteoporotic right metatarsal fracture following fall which would not   usually break a normal, healthy bone  -- Traumatic right metatarsal fracture  -- Other - I will add my own diagnosis  -- Disagree - Not applicable / Not valid  -- Disagree - Clinically unable to determine / Unknown  -- Refer to Clinical Documentation Reviewer    PROVIDER RESPONSE TEXT:    This patient has an osteoporotic right metatarsal fracture following fall   which would not break a normal, healthy bone. Query created by:  Chetna Wall on 11/10/2023 1:46 PM      Electronically signed by:  Nasrin Salter MD 2023 11:33 AM

## 2023-11-22 NOTE — PROGRESS NOTES
Physician Progress Note      PATIENT:               Emerita Chow  [facility] #:                  [id number]  :                       1950  ADMIT DATE:       2023 6:28 PM  [facility] DATE:        2023 1:15 PM  RESPONDING  PROVIDER #:        [address] TEXT:    Pt admitted with foot fracture after fall. Pt noted to also have right humerus   fracture. If possible, please document in progress notes and discharge   summary if you are evaluating and/or treating any of the following: The medical record reflects the following:  Risk Factors: Osteopenia, Osteoporotic fracture of foot, age, female    Clinical Indicators: Patient noted with have osteoporotic right metatarsal   fracture following fall. Noted to also have nondisplaced incomplete humerus   fracture on MRI. Xray of left hand notes osteopenia. Treatment: MRI, vitamin D, PT/PT  Options provided:  -- Osteoporotic right humerus fracture following fall which would not usually   break a normal, healthy bone  -- Traumatic right humerus fracture  -- Other - I will add my own diagnosis  -- Disagree - Not applicable / Not valid  -- Disagree - Clinically unable to determine / Unknown  -- Refer to Clinical Documentation Reviewer    PROVIDER RESPONSE TEXT:    This patient has a traumatic right humerus fracture. Query created by:  Teri Colon on 2023 9:07 AM      Electronically signed by:  Ruben Mars 2023 11:00 AM

## 2023-11-30 ENCOUNTER — OFFICE VISIT (OUTPATIENT)
Dept: ORTHOPEDIC SURGERY | Age: 73
End: 2023-11-30

## 2023-11-30 DIAGNOSIS — S92.301A CLOSED NONDISPLACED FRACTURE OF METATARSAL BONE OF RIGHT FOOT, UNSPECIFIED METATARSAL, INITIAL ENCOUNTER: ICD-10-CM

## 2023-11-30 DIAGNOSIS — S42.201A CLOSED FRACTURE OF PROXIMAL END OF RIGHT HUMERUS, UNSPECIFIED FRACTURE MORPHOLOGY, INITIAL ENCOUNTER: Primary | ICD-10-CM

## 2023-11-30 NOTE — PROGRESS NOTES
the right upper extremity do what ever she feels comfortable doing. I think it be worthwhile for her doing full active and passive range of motion and she can do light strengthening exercises up to 10 pounds resistance with her right upper extremity. Again the sling is for her comfort so as she feels better if she wants to discontinue its use that is fine. She can use her right upper extremity to use either a walker or crutches as pain allows as well. For the right lower extremity I think it would be important for her to be in the boot at all times when she is up and ambulating. I think this for her comfort we would encourage her to try to put weight on the heel of her right foot but as she gets better she can probably just weight-bear in the boot again if she does not have any pain that would be fine. She does not need to wear the boot at night. She can come out of this to shower and she only needs to wear it when she is up and around.   I will see her back in about 4 weeks with true AP and scapular Y views of the right shoulder and 3 views of the right foot on return she will be about 6 weeks out at that time    Signed By: Bowen Hill MD     November 30, 2023

## 2023-12-05 ENCOUNTER — TELEPHONE (OUTPATIENT)
Dept: ORTHOPEDIC SURGERY | Age: 73
End: 2023-12-05

## 2023-12-05 NOTE — TELEPHONE ENCOUNTER
Pt would like for you too call her too discuss MRI pt not able to physical therapy that he recommended please f/u with her asap

## 2023-12-05 NOTE — TELEPHONE ENCOUNTER
Returned call to pt but had to LM on VM.     Discussed w/ Dr. Alexis Al he did review MRI results. She has a  Nondisplaced incomplete fracture of the surgical neck of the humerus extending across the greater tuberosity. At this time she is to do PT as tolerated as directed in his OV note 11/30 and once out of the Fracture Global period she can be referred for shoulder. No change in orders at this time per VO Dr. Alexis Al. [facility]

## 2023-12-06 ENCOUNTER — TELEPHONE (OUTPATIENT)
Dept: ORTHOPEDIC SURGERY | Age: 73
End: 2023-12-06

## 2023-12-06 NOTE — TELEPHONE ENCOUNTER
Returned call to pt and advised per Dr. Airam Campuzano below:     Discussed w/ Dr. Airam Campuzano he did review MRI results. She has a  Nondisplaced incomplete fracture of the surgical neck of the humerus extending across the greater tuberosity. At this time she is to do PT as tolerated as directed in his OV note 11/30 and once out of the Fracture Global period she can be referred for shoulder. No change in orders at this time per VO Dr. Airam Campuzano.     Pt voiced complete understanding.  [facility]

## 2023-12-28 ENCOUNTER — OFFICE VISIT (OUTPATIENT)
Dept: ORTHOPEDIC SURGERY | Age: 73
End: 2023-12-28

## 2023-12-28 DIAGNOSIS — S92.301A CLOSED NONDISPLACED FRACTURE OF METATARSAL BONE OF RIGHT FOOT, UNSPECIFIED METATARSAL, INITIAL ENCOUNTER: ICD-10-CM

## 2023-12-28 DIAGNOSIS — S42.201A CLOSED FRACTURE OF PROXIMAL END OF RIGHT HUMERUS, UNSPECIFIED FRACTURE MORPHOLOGY, INITIAL ENCOUNTER: Primary | ICD-10-CM

## 2023-12-28 PROCEDURE — 99024 POSTOP FOLLOW-UP VISIT: CPT | Performed by: ORTHOPAEDIC SURGERY

## 2023-12-28 NOTE — PROGRESS NOTES
Progress Note    Patient: Taisha Kilgore MRN: [id number]  SSN: [id number]    YOB: 1950  Age: 75 y.o. Sex: female        12/28/2023      Subjective:     Patient is here today approximately 6 weeks out from really 2 injuries she had a nondisplaced right proximal humerus fracture and nondisplaced right Lisfranc injury. She really seems that she is doing much better with both of these. She does have a few complaints. She says that she seems to have a lot of stiffness in the fingers of her right hand and even a little bit of numbness. She denies any problems with her neck she has no stiffness in her neck no pain and she is never had a history of any issues with her right upper extremity. She says she still has a little bit of difficulty with some things that require overhead activities with her shoulder but in general feels much better. As far as her right foot is concerned this is also doing a lot better. She has been walking on her heel and she says she has had some pain on the medial aspect of her knee over the last few weeks as well    Objective: There were no vitals filed for this visit. Physical Exam:     Skin - no open wounds or pressure sores  Motor and sensory function intact in RIGHT LOWER and RIGHT UPPER extremity  Pulses palpable in RIGHT LOWER and RIGHT UPPER extremity   She can actively elevate her arm to about 135 degrees. She does have some stiffness in all the fingers of her right hand but is neurologically intact  She also has some pretty significant swelling and tenderness to palpation over her Pez anserine bursa and her right knee. She says this is something that disorder bothers her a little bit is not really that troublesome she just wanted to make sure she talk to me about it  XRAY FINDINGS:  [id number] right Lisfranc injury, findings-3 views of the right foot shows the right Lisfranc injury is very well aligned on all 3 views.   There does appear

## 2024-01-25 ENCOUNTER — TELEPHONE (OUTPATIENT)
Dept: ORTHOPEDIC SURGERY | Age: 74
End: 2024-01-25

## 2024-01-25 DIAGNOSIS — R20.2 NUMBNESS AND TINGLING IN RIGHT HAND: Primary | ICD-10-CM

## 2024-01-25 DIAGNOSIS — M24.541 CONTRACTURE OF RIGHT HAND: ICD-10-CM

## 2024-01-25 DIAGNOSIS — R20.0 NUMBNESS AND TINGLING IN RIGHT HAND: Primary | ICD-10-CM

## 2024-01-25 DIAGNOSIS — S42.201A CLOSED FRACTURE OF PROXIMAL END OF RIGHT HUMERUS, UNSPECIFIED FRACTURE MORPHOLOGY, INITIAL ENCOUNTER: ICD-10-CM

## 2024-01-25 NOTE — TELEPHONE ENCOUNTER
Returned call to Pt and she C/O still having pain/tingling in her hand and fingers. She is requesting to have the EMG/NCS ordered now. Per Dr. Montero's note EMG/NCS is the next step,Pt advised  referral will be placed but she will keep sched FU visit on 2/15 for her Shoulder and foot. Pt voiced complete understanding. LH

## 2024-02-26 ENCOUNTER — PROCEDURE VISIT (OUTPATIENT)
Dept: PHYSICAL MEDICINE AND REHAB | Age: 74
End: 2024-02-26
Payer: MEDICARE

## 2024-02-26 VITALS
DIASTOLIC BLOOD PRESSURE: 87 MMHG | HEART RATE: 63 BPM | WEIGHT: 170 LBS | BODY MASS INDEX: 27.32 KG/M2 | SYSTOLIC BLOOD PRESSURE: 131 MMHG | HEIGHT: 66 IN

## 2024-02-26 DIAGNOSIS — G56.01 RIGHT CARPAL TUNNEL SYNDROME: Primary | ICD-10-CM

## 2024-02-26 PROCEDURE — 95910 NRV CNDJ TEST 7-8 STUDIES: CPT | Performed by: PHYSICAL MEDICINE & REHABILITATION

## 2024-02-26 PROCEDURE — 95886 MUSC TEST DONE W/N TEST COMP: CPT | Performed by: PHYSICAL MEDICINE & REHABILITATION

## 2024-03-05 ENCOUNTER — OFFICE VISIT (OUTPATIENT)
Dept: ORTHOPEDIC SURGERY | Age: 74
End: 2024-03-05

## 2024-03-05 DIAGNOSIS — S42.201A CLOSED FRACTURE OF PROXIMAL END OF RIGHT HUMERUS, UNSPECIFIED FRACTURE MORPHOLOGY, INITIAL ENCOUNTER: ICD-10-CM

## 2024-03-05 DIAGNOSIS — S92.301A CLOSED NONDISPLACED FRACTURE OF METATARSAL BONE OF RIGHT FOOT, UNSPECIFIED METATARSAL, INITIAL ENCOUNTER: Primary | ICD-10-CM

## 2024-03-05 DIAGNOSIS — M25.561 RIGHT KNEE PAIN, UNSPECIFIED CHRONICITY: ICD-10-CM

## 2024-03-05 RX ORDER — METHYLPREDNISOLONE ACETATE 80 MG/ML
80 INJECTION, SUSPENSION INTRA-ARTICULAR; INTRALESIONAL; INTRAMUSCULAR; SOFT TISSUE ONCE
Status: COMPLETED | OUTPATIENT
Start: 2024-03-05 | End: 2024-03-05

## 2024-03-05 RX ADMIN — METHYLPREDNISOLONE ACETATE 80 MG: 80 INJECTION, SUSPENSION INTRA-ARTICULAR; INTRALESIONAL; INTRAMUSCULAR; SOFT TISSUE at 12:56

## 2024-03-05 NOTE — PROGRESS NOTES
excellent position on both AP and lateral projection.  The overall alignment of the shoulder and the humeral head appears to be in excellent position within the glenoid.  Impression-healing well aligned right proximal humerus fracture    Indications-right knee pain, findings-standing AP lateral and sunrise views of the right knee shows that she has some moderate degenerative changes in the right knee most significantly in the medial compartment of the right knee.  The patellofemoral joint and lateral compartment appear to be fairly well-preserved.  No evidence of any fractures or dislocations.  Impression-moderate DJD right knee    Assessment:     #1-healing right proximal humerus fracture, #2-healing right nondisplaced Lisfranc injury, #3-right upper extremity carpal tunnel syndrome, #4-right knee pain    Plan:     #1-for the right proximal humerus fracture I think she can essentially be just activity as tolerated now.  I will she needs any further treatment for this.  #2-for the right foot injury I think she can essentially be activity as tolerated I will think she needs any further treatment.  #3-for the carpal tunnel syndrome we Argun to see if she can see either Dr. Morris or Dr. Ahn and see what they think about whether or not some other treatment might be in order for her.  #4-for the right knee pain I have talked to her about the fact that she does have some moderate degenerative changes in the medial aspect of her right knee so after sterile prep I have injected her right knee with Marcaine lidocaine and Depo-Medrol she seems of tolerated this well  PROCEDURE NOTE    PREOP DIAGNOSIS-right knee DJD    POST OP DIAGNOSIS-right knee DJD    PROCEDURE-INJECTION OF right knee OF right lower EXTREMITY    MEDS-3cc of 1% Lidocaine, 3cc of .5% Marcaine and 1 cc of 80mg/ml of DepoMedrol      Patient tolerated the procedure            Signed By: Agustin Montero MD     March 5, 2024

## 2024-03-27 ENCOUNTER — OFFICE VISIT (OUTPATIENT)
Dept: ORTHOPEDIC SURGERY | Age: 74
End: 2024-03-27

## 2024-03-27 DIAGNOSIS — G56.01 RIGHT CARPAL TUNNEL SYNDROME: Primary | ICD-10-CM

## 2024-03-27 RX ORDER — METHYLPREDNISOLONE 4 MG/1
TABLET ORAL
Qty: 1 KIT | Refills: 0 | Status: SHIPPED | OUTPATIENT
Start: 2024-03-27

## 2024-03-27 NOTE — PROGRESS NOTES
Orthopaedic Hand Clinic Note    Name: Kayy Regalado  YOB: 1950  Age: 74 y.o.  Gender: female  MRN: [id number]      CC: Patient referred for evaluation of upper extremity pain    HPI: Kayy Regalado is a 74 y.o. female Right hand dominant with a chief complaint of numbness in median distribution, symptoms started 4 months when she had a fall onto the right side, she was in a sling for a shoulder injury, she thinks this may have started causing the numbness, numbness is mainly in median distribution, it seems to have improved over the past 4 weeks that she has started moving the arm better.      ROS/Meds/PSH/PMH/FH/SH: I personally reviewed the patients standard intake form.  Pertinents are discussed in the HPI    Physical Examination:  General: Awake and alert.  HEENT: Normocephalic, atraumatic  CV/Pulm: Breathing even and unlabored  Skin: No obvious rashes noted.  Lymphatic: No obvious evidence of lymphedema or lymphadenopathy    Musculoskeletal Exam:  Examination on the right upper extremity demonstrates cap refill < 5 seconds in all fingers, subjectively decreased sensation in median distribution, positive Tinel at the carpal tunnel, negative carpal tunnel compression test, no thenar or interossei weakness or wasting.    Imaging / Electrodiagnostic Tests:     Nerve conduction study was reviewed and independently interpreted, this demonstrates moderate severe right carpal tunnel syndrome with sensory latency not recordable but motor latency of 3.8, normal EMG    Assessment:   1. Right carpal tunnel syndrome        Plan:   We discussed the diagnosis and different treatment options. We discussed observation, therapy, antiinflammatory medications and other pertinent treatment modalities.    After discussing in detail the patient elects to proceed with Medrol Dosepak, wrist brace for nighttime use, I will reassess the patient in 2 months, if symptoms persist or have worsened and then we

## 2024-03-28 NOTE — PROGRESS NOTES
Patient was fitted and instructed on an  Wrist Splint for patients right wrist. Patient is aware the hand slides in the brace with the thumb placed threw the thumb hole. I demonstrated the correct way to tighten the brace straps to allow for a comfortable fit. Patient understood the correct way to wear the brace.    Patient read and signed documenting they understand and agree to [facility]'s current DME return policy.

## 2024-05-07 ENCOUNTER — OFFICE VISIT (OUTPATIENT)
Dept: ORTHOPEDIC SURGERY | Age: 74
End: 2024-05-07
Payer: MEDICARE

## 2024-05-07 DIAGNOSIS — M25.561 RIGHT KNEE PAIN, UNSPECIFIED CHRONICITY: Primary | ICD-10-CM

## 2024-05-07 DIAGNOSIS — M17.11 OSTEOARTHRITIS OF RIGHT KNEE, UNSPECIFIED OSTEOARTHRITIS TYPE: ICD-10-CM

## 2024-05-07 PROCEDURE — 1090F PRES/ABSN URINE INCON ASSESS: CPT | Performed by: ORTHOPAEDIC SURGERY

## 2024-05-07 PROCEDURE — G8419 CALC BMI OUT NRM PARAM NOF/U: HCPCS | Performed by: ORTHOPAEDIC SURGERY

## 2024-05-07 PROCEDURE — G8399 PT W/DXA RESULTS DOCUMENT: HCPCS | Performed by: ORTHOPAEDIC SURGERY

## 2024-05-07 PROCEDURE — 1036F TOBACCO NON-USER: CPT | Performed by: ORTHOPAEDIC SURGERY

## 2024-05-07 PROCEDURE — 99213 OFFICE O/P EST LOW 20 MIN: CPT | Performed by: ORTHOPAEDIC SURGERY

## 2024-05-07 PROCEDURE — 1123F ACP DISCUSS/DSCN MKR DOCD: CPT | Performed by: ORTHOPAEDIC SURGERY

## 2024-05-07 PROCEDURE — 20610 DRAIN/INJ JOINT/BURSA W/O US: CPT | Performed by: ORTHOPAEDIC SURGERY

## 2024-05-07 PROCEDURE — G8428 CUR MEDS NOT DOCUMENT: HCPCS | Performed by: ORTHOPAEDIC SURGERY

## 2024-05-07 PROCEDURE — 3017F COLORECTAL CA SCREEN DOC REV: CPT | Performed by: ORTHOPAEDIC SURGERY

## 2024-05-07 RX ORDER — METHYLPREDNISOLONE ACETATE 80 MG/ML
80 INJECTION, SUSPENSION INTRA-ARTICULAR; INTRALESIONAL; INTRAMUSCULAR; SOFT TISSUE ONCE
Status: COMPLETED | OUTPATIENT
Start: 2024-05-07 | End: 2024-05-07

## 2024-05-07 RX ADMIN — METHYLPREDNISOLONE ACETATE 80 MG: 80 INJECTION, SUSPENSION INTRA-ARTICULAR; INTRALESIONAL; INTRAMUSCULAR; SOFT TISSUE at 09:04

## 2024-05-07 NOTE — PROGRESS NOTES
Progress Note    Patient: Kayy Regalado MRN: [id number]  SSN: [id number]    YOB: 1950  Age: 74 y.o.  Sex: female        5/7/2024      Subjective:     Patient is here today in follow-up.  A few weeks ago I injected her right knee and at the time and some of her symptoms seem to be along her medial joint line and some seem to be sort of her Pez anserine bursa.  She says the shot helped only marginally and she is still complaining of a lot of discomfort right around the Pez anserine bursa itself especially sort of twisting motion of her right knee.    Objective:     There were no vitals filed for this visit.       Physical Exam:     Skin - no open wounds or pressure sores  Motor and sensory function intact in RIGHT LOWER extremity  Pulses palpable in RIGHT LOWER extremity   She does have significant tenderness to palpation over her Pez anserine bursa  XRAY FINDINGS:  No new x-rays    Assessment:     Pez anserine bursitis    Plan:     So I have offered this to her and she is excepted.  So after sterile prep I have injected the area of her Pez anserine bursa over the medial aspect of her right knee with lidocaine Marcaine and Depo-Medrol.  She seems of tolerated this well    PROCEDURE NOTE    PREOP DIAGNOSIS-right knee Pez anserine bursitis    POST OP DIAGNOSIS-right knee Pez anserine bursitis    PROCEDURE-INJECTION OF Pez anserine bursa OF right lower EXTREMITY    MEDS-3cc of 1% Lidocaine, 3cc of .5% Marcaine and 1 cc of 80mg/ml of DepoMedrol      Patient tolerated the procedure          Signed By: Agustin Montero MD     May 7, 2024

## 2024-05-28 ENCOUNTER — OFFICE VISIT (OUTPATIENT)
Age: 74
End: 2024-05-28
Payer: MEDICARE

## 2024-05-28 DIAGNOSIS — G56.01 RIGHT CARPAL TUNNEL SYNDROME: Primary | ICD-10-CM

## 2024-05-28 PROCEDURE — 99213 OFFICE O/P EST LOW 20 MIN: CPT | Performed by: ORTHOPAEDIC SURGERY

## 2024-05-28 PROCEDURE — G8428 CUR MEDS NOT DOCUMENT: HCPCS | Performed by: ORTHOPAEDIC SURGERY

## 2024-05-28 PROCEDURE — G8419 CALC BMI OUT NRM PARAM NOF/U: HCPCS | Performed by: ORTHOPAEDIC SURGERY

## 2024-05-28 PROCEDURE — 3017F COLORECTAL CA SCREEN DOC REV: CPT | Performed by: ORTHOPAEDIC SURGERY

## 2024-05-28 PROCEDURE — 1123F ACP DISCUSS/DSCN MKR DOCD: CPT | Performed by: ORTHOPAEDIC SURGERY

## 2024-05-28 PROCEDURE — 1036F TOBACCO NON-USER: CPT | Performed by: ORTHOPAEDIC SURGERY

## 2024-05-28 PROCEDURE — G8399 PT W/DXA RESULTS DOCUMENT: HCPCS | Performed by: ORTHOPAEDIC SURGERY

## 2024-05-28 PROCEDURE — 1090F PRES/ABSN URINE INCON ASSESS: CPT | Performed by: ORTHOPAEDIC SURGERY

## 2024-05-28 NOTE — PROGRESS NOTES
Orthopaedic Hand Clinic Note    Name: Kayy Regalado  YOB: 1950  Age: 74 y.o.  Gender: female  MRN: [id number]          HPI: Kayy Regalado is a 74 y.o. female returns for evaluation of right hand numbness, on the last visit we provided oral steroids and bracing, patient reports symptoms are much better, to recap her history, she was in a sling for a proximal humerus fracture and she believes this aggravated her numbness and paresthesias in the hand      ROS/Meds/PSH/PMH/FH/SH: I personally reviewed the patients standard intake form.  Pertinents are discussed in the HPI    Physical Examination:  General: Awake and alert.  HEENT: Normocephalic, atraumatic  CV/Pulm: Breathing even and unlabored  Skin: No obvious rashes noted.  Lymphatic: No obvious evidence of lymphedema or lymphadenopathy    Musculoskeletal Exam:  Examination on the right upper extremity demonstrates cap refill < 5 seconds in all fingers, subjectively decreased sensation in median distribution, positive Tinel at the carpal tunnel, negative carpal tunnel compression test, no thenar or interossei weakness or wasting.    Imaging / Electrodiagnostic Tests:     Nerve conduction study was reviewed and independently interpreted, this demonstrates moderate severe right carpal tunnel syndrome with sensory latency not recordable but motor latency of 3.8, normal EMG    Assessment:   1. Right carpal tunnel syndrome        Plan:   We discussed the diagnosis and different treatment options. We discussed observation, therapy, antiinflammatory medications and other pertinent treatment modalities.    After discussing in detail the patient elects to proceed with observation for now, symptoms are much better, we did discuss that she has moderate to severe carpal tunnel syndrome and more likely not this will progress, she will follow-up on an as-needed basis.     Patient voiced accordance and understanding of the information provided and the

## 2025-04-11 ENCOUNTER — HOSPITAL ENCOUNTER (OUTPATIENT)
Dept: CT IMAGING | Age: 75
Discharge: HOME | End: 2025-04-13
Payer: MEDICARE

## 2025-04-11 DIAGNOSIS — R59.9 ENLARGED LYMPH NODES: ICD-10-CM

## 2025-04-11 PROCEDURE — 70490 CT SOFT TISSUE NECK W/O DYE: CPT

## 2025-05-20 ENCOUNTER — OFFICE VISIT (OUTPATIENT)
Dept: ORTHOPEDIC SURGERY | Age: 75
End: 2025-05-20
Payer: MEDICARE

## 2025-05-20 DIAGNOSIS — M25.552 LEFT HIP PAIN: Primary | ICD-10-CM

## 2025-05-20 PROCEDURE — 20610 DRAIN/INJ JOINT/BURSA W/O US: CPT | Performed by: ORTHOPAEDIC SURGERY

## 2025-05-20 PROCEDURE — 90000 NO LOS: CPT | Performed by: ORTHOPAEDIC SURGERY

## 2025-05-20 RX ORDER — MELOXICAM 15 MG/1
15 TABLET ORAL DAILY
Qty: 30 TABLET | Refills: 1 | Status: SHIPPED | OUTPATIENT
Start: 2025-05-20

## 2025-05-20 RX ORDER — METHYLPREDNISOLONE ACETATE 80 MG/ML
80 INJECTION, SUSPENSION INTRA-ARTICULAR; INTRALESIONAL; INTRAMUSCULAR; SOFT TISSUE ONCE
Status: COMPLETED | OUTPATIENT
Start: 2025-05-20 | End: 2025-05-20

## 2025-05-20 RX ADMIN — METHYLPREDNISOLONE ACETATE 80 MG: 80 INJECTION, SUSPENSION INTRA-ARTICULAR; INTRALESIONAL; INTRAMUSCULAR; SOFT TISSUE at 09:44

## 2025-05-20 NOTE — PROGRESS NOTES
Progress Note    Patient: Kayy Regalado MRN: [id number]  SSN: [id number]    YOB: 1950  Age: 75 y.o.  Sex: female        5/20/2025      Subjective:     Patient is here today in follow-up.  She is a few years out from percutaneous screw fixation of a left femoral neck fracture.  She has done really well with this for quite some time but more recently after she has sort of a weekend where she did a lot of work around her house doing a lot of boxing things up and carrying things she is been having a lot of pain on the lateral aspect of her left hip.  She localizes laterally and posteriorly sort of radiates up and down the area of her left hip.  She has a little bit of groin pain as well but it seems to be more lateral and posterior    Objective:     There were no vitals filed for this visit.       Physical Exam:     Skin - no open wounds or pressure sores  Motor and sensory function intact in LEFT LOWER extremity  Pulses palpable in LEFT LOWER extremity   She does have pretty significant tenderness to palpation over her greater trochanter especially when she is lying on her right side.  XRAY FINDINGS:  I have reviewed her x-rays from May 7 that her primary care doctor ordered of her left hip which shows a femoral neck fracture that is healed.  She does have 3 cannulated screws that are in good position with no evidence of loosening or failure.  She does have some moderate degenerative joint disease in her hip joint itself.  Her left is a little more advanced than the right but still some preservation of her articular surface and only mild osteophyte formation.  No evidence of any new fractures    Assessment:     Left hip greater trochanter bursitis    Plan:     So it does seem like clinically her symptoms and signs are most consistent with left hip greater trochanter bursitis so I have offered her an injection and she has excepted so after sterile prep I have injected her left hip trochanteric

## 2025-06-09 ENCOUNTER — TELEPHONE (OUTPATIENT)
Dept: ORTHOPEDIC SURGERY | Age: 75
End: 2025-06-09

## 2025-06-09 NOTE — TELEPHONE ENCOUNTER
Spoke with patient. She wants another hp injection. We discussed the difference in troch bursa injection vs. Ultra Sound Guided Hip Injection. Dr. Montero is out of the office this week. Will discuss with him as soon as he returns and get her scheduled. She is understanding.

## 2025-06-09 NOTE — TELEPHONE ENCOUNTER
Patient called is requesting another lt hip inject when is soonest she can have one, she thought it's 3 wks?

## 2025-06-18 ENCOUNTER — TELEPHONE (OUTPATIENT)
Dept: ORTHOPEDIC SURGERY | Age: 75
End: 2025-06-18

## 2025-06-18 NOTE — TELEPHONE ENCOUNTER
Her left hip is no better. She spoke to someone last week and they told her Dr. Montero was out of the office and would call her back. Please return her call about her next step. She is going on a trip next week and will be on her feet a lot. She would like something done this week.

## 2025-07-01 ENCOUNTER — OFFICE VISIT (OUTPATIENT)
Dept: ORTHOPEDIC SURGERY | Age: 75
End: 2025-07-01
Payer: MEDICARE

## 2025-07-01 DIAGNOSIS — M25.552 LEFT HIP PAIN: Primary | ICD-10-CM

## 2025-07-01 PROCEDURE — 1090F PRES/ABSN URINE INCON ASSESS: CPT | Performed by: ORTHOPAEDIC SURGERY

## 2025-07-01 PROCEDURE — G8428 CUR MEDS NOT DOCUMENT: HCPCS | Performed by: ORTHOPAEDIC SURGERY

## 2025-07-01 PROCEDURE — 1036F TOBACCO NON-USER: CPT | Performed by: ORTHOPAEDIC SURGERY

## 2025-07-01 PROCEDURE — 99213 OFFICE O/P EST LOW 20 MIN: CPT | Performed by: ORTHOPAEDIC SURGERY

## 2025-07-01 PROCEDURE — G8421 BMI NOT CALCULATED: HCPCS | Performed by: ORTHOPAEDIC SURGERY

## 2025-07-01 PROCEDURE — G8399 PT W/DXA RESULTS DOCUMENT: HCPCS | Performed by: ORTHOPAEDIC SURGERY

## 2025-07-01 PROCEDURE — 1123F ACP DISCUSS/DSCN MKR DOCD: CPT | Performed by: ORTHOPAEDIC SURGERY

## 2025-07-01 PROCEDURE — 20610 DRAIN/INJ JOINT/BURSA W/O US: CPT | Performed by: ORTHOPAEDIC SURGERY

## 2025-07-01 RX ORDER — METHYLPREDNISOLONE ACETATE 40 MG/ML
40 INJECTION, SUSPENSION INTRA-ARTICULAR; INTRALESIONAL; INTRAMUSCULAR; SOFT TISSUE ONCE
Status: COMPLETED | OUTPATIENT
Start: 2025-07-01 | End: 2025-07-01

## 2025-07-01 RX ADMIN — METHYLPREDNISOLONE ACETATE 40 MG: 40 INJECTION, SUSPENSION INTRA-ARTICULAR; INTRALESIONAL; INTRAMUSCULAR; SOFT TISSUE at 09:01

## 2025-07-01 NOTE — PROGRESS NOTES
Progress Note    Patient: Kayy Regalado MRN: [id number]  SSN: [id number]    YOB: 1950  Age: 75 y.o.  Sex: female        7/1/2025      Subjective:     Patient is here today in follow-up.  She is only about 6 weeks to 8 weeks out from an injection to her left hip.  She did have some symptoms consistent with trochanteric bursitis so I did inject this area.  She has had this previous femoral neck fracture treated with a cannulated screw fixation.  She states she felt like only helped for short period of time just a few days really she does describe the pain as in her groin sort of radiating down into the medial aspect of her hip area and also a little bit laterally.  So there is definitely component of this that is also groin pain    Objective:     There were no vitals filed for this visit.       Physical Exam:     Skin - incision is well healed with no redness or drainage  Motor and sensory function intact in LEFT LOWER extremity  Pulses palpable in LEFT LOWER extremity   She does still have some tenderness to palpation over her trochanteric bursa  XRAY FINDINGS:  [id number] left femoral neck fracture, findings-AP and lateral views of the left hip shows the left femoral neck fracture has healed healed in very reasonable position.  Her screws are still very stable in their position.  She does have some moderate degenerative changes in her left hip with some mild osteophyte formation and joint space narrowing.  Impression-healed left femoral neck fracture, moderate degenerative changes left hip    Assessment:     #1 left hip trochanteric bursitis, #2-left hip degenerative joint disease    Plan:     So we talked a little bit about different plans.  I have explained that I do think it would be reasonable to see if one of the total joint surgeons could see her and see if they thought that a total hip replacement would help her.  She definitely has a component of groin pain so it could be

## 2025-07-02 ENCOUNTER — TRANSCRIBE ORDERS (OUTPATIENT)
Dept: SCHEDULING | Age: 75
End: 2025-07-02

## 2025-07-02 DIAGNOSIS — Z78.0 ASYMPTOMATIC MENOPAUSAL STATE: Primary | ICD-10-CM

## 2025-07-02 DIAGNOSIS — Z12.31 ENCOUNTER FOR SCREENING MAMMOGRAM FOR MALIGNANT NEOPLASM OF BREAST: ICD-10-CM

## 2025-07-29 ENCOUNTER — HOSPITAL ENCOUNTER (OUTPATIENT)
Dept: MAMMOGRAPHY | Age: 75
Discharge: HOME | End: 2025-08-01
Attending: INTERNAL MEDICINE
Payer: MEDICARE

## 2025-07-29 ENCOUNTER — APPOINTMENT (OUTPATIENT)
Dept: MAMMOGRAPHY | Age: 75
End: 2025-07-29
Attending: INTERNAL MEDICINE
Payer: MEDICARE

## 2025-07-29 DIAGNOSIS — Z12.31 ENCOUNTER FOR SCREENING MAMMOGRAM FOR MALIGNANT NEOPLASM OF BREAST: ICD-10-CM

## 2025-07-29 PROCEDURE — 77067 SCR MAMMO BI INCL CAD: CPT

## 2025-08-15 ENCOUNTER — OFFICE VISIT (OUTPATIENT)
Dept: ORTHOPEDIC SURGERY | Age: 75
End: 2025-08-15
Payer: MEDICARE

## 2025-08-15 VITALS — BODY MASS INDEX: 26.2 KG/M2 | HEIGHT: 66 IN | WEIGHT: 163 LBS

## 2025-08-15 DIAGNOSIS — M76.892 HIP ABDUCTOR TENDONITIS, LEFT: ICD-10-CM

## 2025-08-15 DIAGNOSIS — M25.561 PAIN IN BOTH KNEES, UNSPECIFIED CHRONICITY: ICD-10-CM

## 2025-08-15 DIAGNOSIS — M16.12 ARTHRITIS OF LEFT HIP: ICD-10-CM

## 2025-08-15 DIAGNOSIS — M25.552 LEFT HIP PAIN: Primary | ICD-10-CM

## 2025-08-15 DIAGNOSIS — M25.562 PAIN IN BOTH KNEES, UNSPECIFIED CHRONICITY: ICD-10-CM

## 2025-08-15 PROCEDURE — 99214 OFFICE O/P EST MOD 30 MIN: CPT | Performed by: ORTHOPAEDIC SURGERY

## 2025-08-15 PROCEDURE — G8428 CUR MEDS NOT DOCUMENT: HCPCS | Performed by: ORTHOPAEDIC SURGERY

## 2025-08-15 PROCEDURE — 1123F ACP DISCUSS/DSCN MKR DOCD: CPT | Performed by: ORTHOPAEDIC SURGERY

## 2025-08-15 PROCEDURE — G8399 PT W/DXA RESULTS DOCUMENT: HCPCS | Performed by: ORTHOPAEDIC SURGERY

## 2025-08-15 PROCEDURE — 1036F TOBACCO NON-USER: CPT | Performed by: ORTHOPAEDIC SURGERY

## 2025-08-15 PROCEDURE — G8419 CALC BMI OUT NRM PARAM NOF/U: HCPCS | Performed by: ORTHOPAEDIC SURGERY

## 2025-08-15 PROCEDURE — 1090F PRES/ABSN URINE INCON ASSESS: CPT | Performed by: ORTHOPAEDIC SURGERY

## 2025-08-15 RX ORDER — METHYLPREDNISOLONE 4 MG/1
TABLET ORAL
Qty: 1 KIT | Refills: 0 | Status: SHIPPED | OUTPATIENT
Start: 2025-08-15 | End: 2025-08-21

## 2025-08-15 RX ORDER — MELOXICAM 15 MG/1
15 TABLET ORAL DAILY
Qty: 30 TABLET | Refills: 3 | Status: SHIPPED | OUTPATIENT
Start: 2025-08-15

## 2025-08-27 ENCOUNTER — HOSPITAL ENCOUNTER (OUTPATIENT)
Dept: MAMMOGRAPHY | Age: 75
Discharge: HOME | End: 2025-08-30
Attending: INTERNAL MEDICINE
Payer: MEDICARE

## 2025-08-27 DIAGNOSIS — Z78.0 ASYMPTOMATIC MENOPAUSAL STATE: ICD-10-CM

## 2025-08-27 PROCEDURE — 77080 DXA BONE DENSITY AXIAL: CPT
